# Patient Record
Sex: FEMALE | Race: WHITE | URBAN - METROPOLITAN AREA
[De-identification: names, ages, dates, MRNs, and addresses within clinical notes are randomized per-mention and may not be internally consistent; named-entity substitution may affect disease eponyms.]

---

## 2018-10-18 ENCOUNTER — HOSPITAL ENCOUNTER (INPATIENT)
Age: 63
LOS: 8 days | Discharge: NURSING, REHAB OR LONG-TERM CARE | DRG: 560 | End: 2018-10-26
Attending: PHYSICAL MEDICINE & REHABILITATION | Admitting: PHYSICAL MEDICINE & REHABILITATION
Payer: COMMERCIAL

## 2018-10-18 DIAGNOSIS — S32.441A CLOSED DISPLACED FRACTURE OF POSTERIOR COLUMN OF RIGHT ACETABULUM, INITIAL ENCOUNTER (HCC): Primary | ICD-10-CM

## 2018-10-18 PROCEDURE — 51798 US URINE CAPACITY MEASURE: CPT

## 2018-10-18 PROCEDURE — 94664 DEMO&/EVAL PT USE INHALER: CPT

## 2018-10-18 PROCEDURE — 94760 N-INVAS EAR/PLS OXIMETRY 1: CPT

## 2018-10-18 PROCEDURE — 1280000000 HC REHAB R&B

## 2018-10-18 PROCEDURE — 6370000000 HC RX 637 (ALT 250 FOR IP): Performed by: PHYSICAL MEDICINE & REHABILITATION

## 2018-10-18 RX ORDER — OXYCODONE HYDROCHLORIDE 5 MG/1
5 TABLET ORAL EVERY 4 HOURS PRN
Status: DISCONTINUED | OUTPATIENT
Start: 2018-10-18 | End: 2018-10-26 | Stop reason: HOSPADM

## 2018-10-18 RX ORDER — LEVOTHYROXINE SODIUM 0.1 MG/1
200 TABLET ORAL
Status: DISCONTINUED | OUTPATIENT
Start: 2018-10-19 | End: 2018-10-26 | Stop reason: HOSPADM

## 2018-10-18 RX ORDER — POLYETHYLENE GLYCOL 3350 17 G/17G
17 POWDER, FOR SOLUTION ORAL DAILY
Status: DISCONTINUED | OUTPATIENT
Start: 2018-10-18 | End: 2018-10-23

## 2018-10-18 RX ORDER — BISACODYL 10 MG
10 SUPPOSITORY, RECTAL RECTAL DAILY PRN
Status: DISCONTINUED | OUTPATIENT
Start: 2018-10-18 | End: 2018-10-26 | Stop reason: HOSPADM

## 2018-10-18 RX ORDER — ACETAMINOPHEN 500 MG
1000 TABLET ORAL EVERY 8 HOURS SCHEDULED
Status: DISCONTINUED | OUTPATIENT
Start: 2018-10-18 | End: 2018-10-26 | Stop reason: HOSPADM

## 2018-10-18 RX ORDER — ONDANSETRON 4 MG/1
4 TABLET, FILM COATED ORAL EVERY 8 HOURS PRN
Status: DISCONTINUED | OUTPATIENT
Start: 2018-10-18 | End: 2018-10-26 | Stop reason: HOSPADM

## 2018-10-18 RX ORDER — DOCUSATE SODIUM 100 MG/1
100 CAPSULE, LIQUID FILLED ORAL 2 TIMES DAILY
Status: DISCONTINUED | OUTPATIENT
Start: 2018-10-18 | End: 2018-10-23

## 2018-10-18 RX ORDER — OXYCODONE HYDROCHLORIDE 10 MG/1
10 TABLET ORAL EVERY 4 HOURS PRN
Status: DISCONTINUED | OUTPATIENT
Start: 2018-10-18 | End: 2018-10-26 | Stop reason: HOSPADM

## 2018-10-18 RX ORDER — ATORVASTATIN CALCIUM 20 MG/1
20 TABLET, FILM COATED ORAL DAILY
Status: DISCONTINUED | OUTPATIENT
Start: 2018-10-19 | End: 2018-10-26 | Stop reason: HOSPADM

## 2018-10-18 RX ORDER — OXYCODONE HYDROCHLORIDE 5 MG/1
5 TABLET ORAL EVERY 4 HOURS PRN
Status: DISCONTINUED | OUTPATIENT
Start: 2018-10-18 | End: 2018-10-18 | Stop reason: ALTCHOICE

## 2018-10-18 RX ORDER — OXYCODONE HYDROCHLORIDE 10 MG/1
10 TABLET ORAL EVERY 4 HOURS PRN
Status: DISCONTINUED | OUTPATIENT
Start: 2018-10-18 | End: 2018-10-18 | Stop reason: ALTCHOICE

## 2018-10-18 RX ADMIN — DOCUSATE SODIUM 100 MG: 100 CAPSULE, LIQUID FILLED ORAL at 22:16

## 2018-10-18 RX ADMIN — OXYCODONE HYDROCHLORIDE 10 MG: 10 TABLET ORAL at 20:15

## 2018-10-18 ASSESSMENT — PAIN DESCRIPTION - ONSET: ONSET: GRADUAL

## 2018-10-18 ASSESSMENT — PAIN DESCRIPTION - FREQUENCY: FREQUENCY: CONTINUOUS

## 2018-10-18 ASSESSMENT — PAIN DESCRIPTION - DESCRIPTORS: DESCRIPTORS: ACHING;DISCOMFORT;TENDER

## 2018-10-18 ASSESSMENT — PAIN DESCRIPTION - PAIN TYPE: TYPE: SURGICAL PAIN

## 2018-10-18 ASSESSMENT — PAIN DESCRIPTION - ORIENTATION: ORIENTATION: RIGHT;LOWER

## 2018-10-18 ASSESSMENT — PAIN DESCRIPTION - LOCATION: LOCATION: HIP;BACK;PELVIS

## 2018-10-18 ASSESSMENT — PAIN SCALES - GENERAL: PAINLEVEL_OUTOF10: 7

## 2018-10-18 ASSESSMENT — PAIN DESCRIPTION - PROGRESSION: CLINICAL_PROGRESSION: NOT CHANGED

## 2018-10-18 NOTE — H&P
acetaminophen (TYLENOL) tablet 1,000 mg  1,000 mg Oral 3 times per day Zaira Bravo MD   1,000 mg at 10/19/18 0731    magnesium hydroxide (MILK OF MAGNESIA) 400 MG/5ML suspension 30 mL  30 mL Oral Daily PRN Zaira Bravo MD        docusate sodium (COLACE) capsule 100 mg  100 mg Oral BID Zaira Bravo MD   100 mg at 10/19/18 0731    bisacodyl (DULCOLAX) suppository 10 mg  10 mg Rectal Daily PRN Zaira Bravo MD        polyethylene glycol [facility]) packet 17 g  17 g Oral Daily Zaira Bravo MD   17 g at 10/19/18 0805    enoxaparin (LOVENOX) injection 30 mg  30 mg Subcutaneous BID Zaira Bravo MD   Stopped at 10/19/18 0731    oxyCODONE (ROXICODONE) immediate release tablet 5 mg  5 mg Oral Q4H PRN Zaira Bravo MD        Or    oxyCODONE HCl (OXY-IR) immediate release tablet 10 mg  10 mg Oral Q4H PRN Zaira Bravo MD   10 mg at 10/19/18 8797         REVIEW OF SYSTEMS:   CONSTITUTIONAL: negative for fevers, chills, diaphoresis, appetite change, night sweats, unexpected weight change, +fatigue. EYES: negative for blurred vision, eye discharge, visual disturbance and icterus. HEENT: negative for hearing loss, tinnitus, ear drainage, sinus pressure, nasal congestion, epistaxis and snoring. RESPIRATORY: Negative for hemoptysis, cough, sputum production. +CUELLO  CARDIOVASCULAR: negative for chest pain, palpitations, exertional chest pressure/discomfort, syncope, edema   GASTROINTESTINAL: negative for nausea, vomiting, diarrhea, blood in stool, abdominal pain, +constipation. GENITOURINARY: negative for frequency, dysuria, urinary incontinence, decreased urine volume, and hematuria. HEMATOLOGIC/LYMPHATIC: negative for easy bruising, bleeding and lymphadenopathy. ALLERGIC/IMMUNOLOGIC: negative for recurrent infections, angioedema, anaphylaxis and drug reactions. ENDOCRINE: negative for weight changes and diabetic symptoms including polyuria, polydipsia and polyphagia. MUSCULOSKELETAL: refer to HPI  NEUROLOGICAL: negative for headaches, slurred speech, unilateral weakness. PSYCHIATRIC/BEHAVIORAL: negative for hallucinations, behavioral problems, confusion and agitation. All pertinent positives are noted in the HPI. Physical Examination:  Vitals:   Patient Vitals for the past 24 hrs:   BP Temp Temp src Pulse Resp SpO2 Height Weight   10/18/18 1846 118/65 98.4 °F (36.9 °C) Oral 87 16 96 % 5' 4\" (1.626 m) 187 lb 6.3 oz (85 kg)       Const: Alert. WDWN. No distress  Eyes: Conjunctiva noninjected, no icterus noted; pupils equal, round, and reactive to light. HENT: Atraumatic, normocephalic; Oral mucosa moist  Neck: Trachea midline, neck supple. No thyromegaly noted. CV: Regular rate and rhythm, no murmur rub or gallop noted  Resp: Lungs clear to auscultation bilaterally, no rales wheezes or ronchi, no retractions. Respirations unlabored. GI: Soft, nontender, nondistended. Normal bowel sounds. No palpable masses. Skin: Normal temperature and turgor. No rashes or breakdown noted. Ext: No significant edema appreciated. No varicosities. MSK: Right hip incision c/d/i. Right hip ROM limited by pain. Otherwise no joint tenderness, erythema, warmth noted. AROM intact. Neuro:   -Mental status: Alert. Oriented to person, place, time, situation.   -Language: Speech fluent  -Cranial nerves: Intact  -Sensation intact to light touch. -Motor examination reveals normal strength in all four limbs diffusely except right hip (pain limited). -No abnormalities with finger/nose noted. -Reflexes 2+ and symmetric.  Negative Royce  Psych: Stable mood, normal judgement, normal affect     Lab Results   Component Value Date    WBC 9.4 10/19/2018    HGB 9.3 (L) 10/19/2018    HCT 28.0 (L) 10/19/2018    MCV 88.0 10/19/2018     10/19/2018     No results found for: INR, PROTIME  Lab Results   Component Value Date    CREATININE <0.5 (L) 10/19/2018    BUN 11 10/19/2018

## 2018-10-19 LAB
ANION GAP SERPL CALCULATED.3IONS-SCNC: 12 MMOL/L (ref 3–16)
BASOPHILS ABSOLUTE: 0 K/UL (ref 0–0.2)
BASOPHILS RELATIVE PERCENT: 0.3 %
BUN BLDV-MCNC: 11 MG/DL (ref 7–20)
CALCIUM SERPL-MCNC: 8.3 MG/DL (ref 8.3–10.6)
CHLORIDE BLD-SCNC: 100 MMOL/L (ref 99–110)
CO2: 26 MMOL/L (ref 21–32)
CREAT SERPL-MCNC: <0.5 MG/DL (ref 0.6–1.2)
EOSINOPHILS ABSOLUTE: 0.5 K/UL (ref 0–0.6)
EOSINOPHILS RELATIVE PERCENT: 5.2 %
GFR AFRICAN AMERICAN: >60
GFR NON-AFRICAN AMERICAN: >60
GLUCOSE BLD-MCNC: 92 MG/DL (ref 70–99)
HCT VFR BLD CALC: 28 % (ref 36–48)
HEMOGLOBIN: 9.3 G/DL (ref 12–16)
LYMPHOCYTES ABSOLUTE: 1.9 K/UL (ref 1–5.1)
LYMPHOCYTES RELATIVE PERCENT: 20.2 %
MCH RBC QN AUTO: 29.4 PG (ref 26–34)
MCHC RBC AUTO-ENTMCNC: 33.4 G/DL (ref 31–36)
MCV RBC AUTO: 88 FL (ref 80–100)
MONOCYTES ABSOLUTE: 0.9 K/UL (ref 0–1.3)
MONOCYTES RELATIVE PERCENT: 9.4 %
NEUTROPHILS ABSOLUTE: 6.1 K/UL (ref 1.7–7.7)
NEUTROPHILS RELATIVE PERCENT: 64.9 %
PDW BLD-RTO: 13.1 % (ref 12.4–15.4)
PLATELET # BLD: 259 K/UL (ref 135–450)
PMV BLD AUTO: 9.5 FL (ref 5–10.5)
POTASSIUM REFLEX MAGNESIUM: 3.8 MMOL/L (ref 3.5–5.1)
PREALBUMIN: 8.2 MG/DL (ref 20–40)
RBC # BLD: 3.18 M/UL (ref 4–5.2)
SODIUM BLD-SCNC: 138 MMOL/L (ref 136–145)
WBC # BLD: 9.4 K/UL (ref 4–11)

## 2018-10-19 PROCEDURE — 85025 COMPLETE CBC W/AUTO DIFF WBC: CPT

## 2018-10-19 PROCEDURE — 97166 OT EVAL MOD COMPLEX 45 MIN: CPT

## 2018-10-19 PROCEDURE — 97530 THERAPEUTIC ACTIVITIES: CPT

## 2018-10-19 PROCEDURE — 97535 SELF CARE MNGMENT TRAINING: CPT

## 2018-10-19 PROCEDURE — 80048 BASIC METABOLIC PNL TOTAL CA: CPT

## 2018-10-19 PROCEDURE — 1280000000 HC REHAB R&B

## 2018-10-19 PROCEDURE — 6370000000 HC RX 637 (ALT 250 FOR IP): Performed by: PHYSICAL MEDICINE & REHABILITATION

## 2018-10-19 PROCEDURE — 84134 ASSAY OF PREALBUMIN: CPT

## 2018-10-19 PROCEDURE — 97162 PT EVAL MOD COMPLEX 30 MIN: CPT

## 2018-10-19 RX ORDER — ACYCLOVIR 200 MG/1
400 CAPSULE ORAL 3 TIMES DAILY
Status: DISCONTINUED | OUTPATIENT
Start: 2018-10-19 | End: 2018-10-26 | Stop reason: HOSPADM

## 2018-10-19 RX ORDER — LEVOTHYROXINE SODIUM 0.2 MG/1
200 TABLET ORAL DAILY
COMMUNITY
Start: 2018-09-11

## 2018-10-19 RX ORDER — ERGOCALCIFEROL 1.25 MG/1
50000 CAPSULE ORAL WEEKLY
Status: DISCONTINUED | OUTPATIENT
Start: 2018-10-20 | End: 2018-10-26 | Stop reason: HOSPADM

## 2018-10-19 RX ORDER — LANOLIN ALCOHOL/MO/W.PET/CERES
3 CREAM (GRAM) TOPICAL NIGHTLY PRN
Status: DISCONTINUED | OUTPATIENT
Start: 2018-10-19 | End: 2018-10-26 | Stop reason: HOSPADM

## 2018-10-19 RX ORDER — ATORVASTATIN CALCIUM 40 MG/1
20 TABLET, FILM COATED ORAL DAILY
COMMUNITY
Start: 2018-09-11

## 2018-10-19 RX ORDER — ERGOCALCIFEROL (VITAMIN D2) 1250 MCG
50000 CAPSULE ORAL WEEKLY
COMMUNITY

## 2018-10-19 RX ORDER — VALACYCLOVIR HYDROCHLORIDE 500 MG/1
500 TABLET, FILM COATED ORAL 2 TIMES DAILY
COMMUNITY
Start: 2018-09-11

## 2018-10-19 RX ORDER — PANTOPRAZOLE SODIUM 40 MG/1
40 TABLET, DELAYED RELEASE ORAL
Status: DISCONTINUED | OUTPATIENT
Start: 2018-10-19 | End: 2018-10-26 | Stop reason: HOSPADM

## 2018-10-19 RX ADMIN — LEVOTHYROXINE SODIUM 200 MCG: 100 TABLET ORAL at 07:04

## 2018-10-19 RX ADMIN — ACETAMINOPHEN 1000 MG: 500 TABLET ORAL at 15:25

## 2018-10-19 RX ADMIN — PANTOPRAZOLE SODIUM 40 MG: 40 TABLET, DELAYED RELEASE ORAL at 11:15

## 2018-10-19 RX ADMIN — ACYCLOVIR 400 MG: 200 CAPSULE ORAL at 15:25

## 2018-10-19 RX ADMIN — ACETAMINOPHEN 1000 MG: 500 TABLET ORAL at 07:31

## 2018-10-19 RX ADMIN — ACETAMINOPHEN 1000 MG: 500 TABLET ORAL at 22:17

## 2018-10-19 RX ADMIN — OXYCODONE HYDROCHLORIDE 10 MG: 10 TABLET ORAL at 22:15

## 2018-10-19 RX ADMIN — POLYETHYLENE GLYCOL 3350 17 G: 17 POWDER, FOR SOLUTION ORAL at 08:05

## 2018-10-19 RX ADMIN — ACYCLOVIR 400 MG: 200 CAPSULE ORAL at 22:15

## 2018-10-19 RX ADMIN — OXYCODONE HYDROCHLORIDE 10 MG: 10 TABLET ORAL at 07:04

## 2018-10-19 RX ADMIN — DOCUSATE SODIUM 100 MG: 100 CAPSULE, LIQUID FILLED ORAL at 07:31

## 2018-10-19 RX ADMIN — DOCUSATE SODIUM 100 MG: 100 CAPSULE, LIQUID FILLED ORAL at 22:15

## 2018-10-19 RX ADMIN — OXYCODONE HYDROCHLORIDE 10 MG: 10 TABLET ORAL at 15:25

## 2018-10-19 RX ADMIN — OXYCODONE HYDROCHLORIDE 10 MG: 10 TABLET ORAL at 11:07

## 2018-10-19 RX ADMIN — ATORVASTATIN CALCIUM 20 MG: 20 TABLET, FILM COATED ORAL at 08:05

## 2018-10-19 RX ADMIN — OXYCODONE HYDROCHLORIDE 10 MG: 10 TABLET ORAL at 00:20

## 2018-10-19 ASSESSMENT — PAIN DESCRIPTION - FREQUENCY
FREQUENCY: CONTINUOUS
FREQUENCY: CONTINUOUS

## 2018-10-19 ASSESSMENT — PAIN DESCRIPTION - DESCRIPTORS
DESCRIPTORS: ACHING;DISCOMFORT
DESCRIPTORS: DULL;DISCOMFORT
DESCRIPTORS: ACHING;DISCOMFORT;TENDER

## 2018-10-19 ASSESSMENT — PAIN SCALES - GENERAL
PAINLEVEL_OUTOF10: 3
PAINLEVEL_OUTOF10: 7
PAINLEVEL_OUTOF10: 3
PAINLEVEL_OUTOF10: 0
PAINLEVEL_OUTOF10: 8
PAINLEVEL_OUTOF10: 3
PAINLEVEL_OUTOF10: 3
PAINLEVEL_OUTOF10: 8
PAINLEVEL_OUTOF10: 3
PAINLEVEL_OUTOF10: 9
PAINLEVEL_OUTOF10: 7

## 2018-10-19 ASSESSMENT — PAIN DESCRIPTION - ORIENTATION
ORIENTATION: RIGHT;LOWER
ORIENTATION: RIGHT;LOWER
ORIENTATION: RIGHT

## 2018-10-19 ASSESSMENT — PAIN DESCRIPTION - LOCATION
LOCATION: HIP;PELVIS;BACK
LOCATION: HIP
LOCATION: HIP;PELVIS;BACK
LOCATION: HIP;PELVIS;BACK

## 2018-10-19 ASSESSMENT — PAIN DESCRIPTION - PROGRESSION
CLINICAL_PROGRESSION: NOT CHANGED
CLINICAL_PROGRESSION: NOT CHANGED

## 2018-10-19 ASSESSMENT — PAIN DESCRIPTION - ONSET: ONSET: GRADUAL

## 2018-10-19 ASSESSMENT — PAIN DESCRIPTION - PAIN TYPE
TYPE: SURGICAL PAIN

## 2018-10-19 NOTE — PLAN OF CARE
[address] [address]   SUZANNE Zap[address] 69  [phone]    Octavio Han    : 1955  Acct #: [de-identified]  MRN: [id number]   PHYSICIAN:  Janel Steinberg MD    Rehabilitation Diagnosis:    Right posterior column acetabular fracture - s/p ORIF (10/15 with Dr. Ventura Dawson). Wound care. Pain control. DAVY. PT/OT.      Osteoporosis - Vitamin D supplementation     Acute blood loss anemia - Post-surgical. No signs of active bleeding. Monitor Hgb and for sx with therapies. Transfuse prn <7.      SOB with exertion - IS, increase endurance wit therapies. CXR at [facility] with concern for suprahilar mass. Consider repeat vs CT chest to further characterize.      HLD - statin     Hypothyroidism - Levothyroxine     H/o morbid obesity s/p gastrectomy sleeve - Dietician consult.      Bladder - high risk retention - Monitor PVRs, SC prn >300cc     Bowel - Constipation - colace BID, miralax daily, PRN MoM. follow bowel movements. Enema or suppository if needed.      Pain control - Scheduled acetaminophen, prn oxycodone       ADMIT DATE:10/18/2018    Patient Goals: Goal is to return home    Admitting Impairments: Decreased right hip ROM, limited weight bearing, decreased balance, decreased endurance  Barriers: Decreased right hip ROM, limited weight bearing, decreased balance, decreased endurance, medical comorbidities  Participation: Patient works full time as a nurse manager at [employer] and is very active-likes to swim, bike, hike, scrap book, play with grandchildren.   Wants to return to her active lifestyle      CARE PLAN     NURSING:  Octavio Han while on this unit will:     [x] Be continent of bowel and bladder     [x] Have an adequate number of bowel movements  [] Urinate with no urinary retention >300ml in bladder  [] Complete bladder protocol with perry removal  [x] Maintain O2 SATs at 92%  [x] Have pain managed while on ARU       [] Be w   FIM: 2/5      Stairs 2/5   Comprehension 7/7   Expression 7/7   Social Interaction 7/7   Problem Solving 7/7   Memory 7/7        Wash Mixer Ord will be seen a minimum of 3 hours of therapy per day, a minimum of 5 out of 7 days per week. [] In this rare instance due to the nature of this patient's medical involvement, this patient will be seen 15 hours per week (900 minutes within a 7 day period). Treatments may include therapeutic exercises, gait training, neuromuscular re-ed, transfer training, community reintegration, bed mobility, w/c mobility and training, self care, home mgmt,  energy conservation,dysphagia tx, speech/language/communication therapy,  and patient/family education. In addition, dietician/nutritionist may monitor calorie count as well as intake and collaboratively work with SLP on dietary upgrades. Neuropsychology/Psychology may evaluate and provide necessary support. Medical issues being managed closely and that require 24 hour availability of a physician:   [] Swallowing Precautions  [x] Bowel/Bladder Fx  [x] Weight bearing precautions   [x] Wound Care    [x] Pain Mgmt   [x] Infection Protection   [x] DVT Prophylaxis   [x] Fall Precautions  [] Fluid/Electrolyte/Nutrition Balance   [] Voice Protection   [] Respiratory  [] Other:    Medical Prognosis: [x] Good  [] Fair    [] Guarded   Total expected IRF days 7  Anticipated discharge destination:    [] Home Independently   [x] Home Modified Independent  [] Home with supervision    []SNF     [] Other                                           Physician anticipated functional outcomes:  Increase gait, transfers, self care to modified independent to allow safe return home with      IPOC brief synthesis: Ms. Sharonda Harden is a 62 yo F with pmh osteoporosis, hypothyroidism, HLD, and obesity s/p gastrectomy sleeve who initially presented to [facility] on 10/12/18 with right hip pain after ground level fall.  Imaging revealed T type

## 2018-10-19 NOTE — PROGRESS NOTES
Physical Therapy    Facility/Department: [address] IP REHAB  Initial Assessment    NAME: Leonides Lazaro  : 1955  MRN: [id number]    Date of Service: 10/19/2018    Discharge Recommendations:  Continue to assess pending progress, Patient would benefit from continued therapy after discharge, Home with Home health PT   PT Equipment Recommendations  Equipment Needed: No  Other: Will continue to assess DME needs     Patient Diagnosis(es): There were no encounter diagnoses. has a past medical history of Arthritis; Hyperlipidemia; Hypertension; and Thyroid disease. has a past surgical history that includes Abdomen surgery; Colonoscopy; fracture surgery; Dilatation, esophagus; Hysterectomy; and hernia repair. Restrictions  Restrictions/Precautions  Restrictions/Precautions: Weight Bearing  Required Braces or Orthoses?: No  Lower Extremity Weight Bearing Restrictions  Right Lower Extremity Weight Bearing: Flat Foot Weight Bearing  Partial Weight Bearing Percentage Or Pounds: foot all the way flat but no pressure (DAVY)  Vision/Hearing  Vision: Impaired  Vision Exceptions: Wears glasses for distance (wears bifocals just for distance )  Hearing: Within functional limits     Subjective  General  Chart Reviewed: Yes  Additional Pertinent Hx: Per Dr. Bridger Ross note, Ms. Leonides Lazaro is a 62 yo F with pmh osteoporosis, hypothyroidism, HLD, and obesity s/p gastrectomy sleeve who initially presented to [facility] on 10/12/18 with right hip pain after ground level fall. Imaging revealed T type transverse tectal bicolumnar right acetabular fracture with posterior dislocation and impaction fracture of the right femoral head with small intra-articular fragments. She underwent ORIF with Dr. Fofana Standing on 10/15. Now weight of leg flatfoot weightbearing. Post-op course complicated by blood loss anemia and constipation.    Response To Previous Treatment: Not applicable  Family / Caregiver Present: No  Referring Practitioner:  decrease R LE strength, and decreased R LE endurance  Clinical Presentation: Evolving   Patient Education: Role of PT, POC, safe transfer technique   Barriers to Learning: None   REQUIRES PT FOLLOW UP: Yes  Activity Tolerance  Activity Tolerance: Patient Tolerated treatment well;Patient limited by fatigue;Patient limited by pain  Activity Tolerance: Pt able to tolerate PT eval this date          Plan   Plan  Times per week: 5-6x/week   Times per day: Twice a day  Plan weeks: 1 week   Current Treatment Recommendations: Strengthening, ROM, Balance Training, Functional Mobility Training, Transfer Training, Endurance Training, Gait Training, Stair training, Home Exercise Program, Safety Education & Training, Patient/Caregiver Education & Training, Equipment Evaluation, Education, & procurement  Safety Devices  Type of devices: All fall risk precautions in place, Gait belt, Patient at risk for falls, Left in chair (Pt transport took pt back to room at end of evaluation )  Restraints  Initially in place: No      Goals  Short term goals  Time Frame for Short term goals: Approximately 1 week from 10/19/18  Short term goal 1: Bed mobility with Mod I  Short term goal 2: Transfers with Mod I   Short term goal 3: Ambulate 150' with LRAD and Mod I   Short term goal 4: Ascend/descend curb step with LRAD and supervision   Short term goal 5: Ascend/descend 12 steps with unilateral and supervision (Anticiapte pt will need to ascend steps on buttocks)  Long term goals  Time Frame for Long term goals : STG=LTG  Patient Goals   Patient goals : To get back to being independent as possible and doing everything I was before.         Therapy Time       Individual Co-treatment   Time In 0915    Time Out 1000    Minutes 39      Second Session   Individual Concurrent Group Co-treatment   Time In 5616         Time Out 1600         Minutes 45         Timed Code Treatment Minutes: 45 Minutes     Electronically signed by Albino Schneider on 10/19/2018

## 2018-10-19 NOTE — CONSULTS
Nutrition Assessment    Type and Reason for Visit: Initial, Consult    Malnutrition Assessment:  · Malnutrition Status: No malnutrition    Nutrition Diagnosis:   · Problem: No nutrition diagnosis at this time    Nutrition Assessment:  · Subjective Assessment: Consult for new pt to ARU. Pt admitted with right hip pain. Pt with PMH of obesity s/p gastric sleeve, HLD, HTN, thryoid disease. Pt s/p gastric sleeve x 1 year. Pt has lost ~50# since her surgery. Pt states she has a good appetite but does eat smaller portions than normal people. Pre requested Ensure HP once a day since she usually drinks one at home to occasionally replace a meal.     Nutrition Risk Level   Risk Level: Low    Nutrition Intervention  Food and/or Delivery: Continue current diet, Start ONS  Nutrition Education/Counseling/Coordination of Care:  Continued Inpatient Monitoring, Education Not Indicated    Patient assessed for nutrition risk. Deemed to be at low risk at this time. Will continue to follow patient.       Electronically signed by Darren Saini RD, ANABELLE on 10/19/18 at 12:42 PM    Contact Number: [phone]

## 2018-10-19 NOTE — PROGRESS NOTES
4 Eyes Skin Assessment     The patient is being assess for  Admission    I agree that 2 RN's have performed a thorough Head to Toe Skin Assessment on the patient. ALL assessment sites listed below have been assessed. Areas assessed by both nurses: Rickey French  [x]   Head, Face, and Ears   [x]   Shoulders, Back, and Chest  [x]   Arms, Elbows, and Hands   [x]   Coccyx, Sacrum, and IschIum  [x]   Legs, Feet, and Heels        Does the Patient have Skin Breakdown?   Yes LDA WOUND CARE was Initiated documentation include the Arleth-wound, Wound Assessment, Measurements, Dressing Treatment, Drainage, and Color\",         Tyler Prevention initiated:  No   Wound Care Orders initiated:  Yes      [id number]  nurse consulted for Pressure Injury (Stage 3,4, Unstageable, DTI, NWPT, and Complex wounds), New and Established Ostomies:  No      Nurse 1 eSignature: Electronically signed by Melany Andrade RN on 10/19/18 at 3:11 AM    **SHARE this note so that the co-signing nurse is able to place an eSignature**    Nurse 2 eSignature: Electronically signed by Sarina Womack RN on 10/19/18 at 3:34 AM

## 2018-10-20 PROCEDURE — 90686 IIV4 VACC NO PRSV 0.5 ML IM: CPT | Performed by: PHYSICAL MEDICINE & REHABILITATION

## 2018-10-20 PROCEDURE — 6370000000 HC RX 637 (ALT 250 FOR IP): Performed by: PHYSICAL MEDICINE & REHABILITATION

## 2018-10-20 PROCEDURE — 1280000000 HC REHAB R&B

## 2018-10-20 PROCEDURE — G0008 ADMIN INFLUENZA VIRUS VAC: HCPCS | Performed by: PHYSICAL MEDICINE & REHABILITATION

## 2018-10-20 PROCEDURE — 97116 GAIT TRAINING THERAPY: CPT

## 2018-10-20 PROCEDURE — 97110 THERAPEUTIC EXERCISES: CPT

## 2018-10-20 PROCEDURE — 6360000002 HC RX W HCPCS: Performed by: PHYSICAL MEDICINE & REHABILITATION

## 2018-10-20 PROCEDURE — 97530 THERAPEUTIC ACTIVITIES: CPT

## 2018-10-20 PROCEDURE — 94760 N-INVAS EAR/PLS OXIMETRY 1: CPT

## 2018-10-20 PROCEDURE — 97535 SELF CARE MNGMENT TRAINING: CPT

## 2018-10-20 RX ADMIN — ACYCLOVIR 400 MG: 200 CAPSULE ORAL at 14:59

## 2018-10-20 RX ADMIN — DOCUSATE SODIUM 100 MG: 100 CAPSULE, LIQUID FILLED ORAL at 21:21

## 2018-10-20 RX ADMIN — OXYCODONE HYDROCHLORIDE 10 MG: 10 TABLET ORAL at 04:21

## 2018-10-20 RX ADMIN — ACYCLOVIR 400 MG: 200 CAPSULE ORAL at 21:21

## 2018-10-20 RX ADMIN — POLYETHYLENE GLYCOL 3350 17 G: 17 POWDER, FOR SOLUTION ORAL at 08:02

## 2018-10-20 RX ADMIN — ACYCLOVIR 400 MG: 200 CAPSULE ORAL at 08:04

## 2018-10-20 RX ADMIN — OXYCODONE HYDROCHLORIDE 10 MG: 10 TABLET ORAL at 11:07

## 2018-10-20 RX ADMIN — OXYCODONE HYDROCHLORIDE 10 MG: 10 TABLET ORAL at 21:21

## 2018-10-20 RX ADMIN — LEVOTHYROXINE SODIUM 200 MCG: 100 TABLET ORAL at 06:20

## 2018-10-20 RX ADMIN — ATORVASTATIN CALCIUM 20 MG: 20 TABLET, FILM COATED ORAL at 08:04

## 2018-10-20 RX ADMIN — ENOXAPARIN SODIUM 30 MG: 30 INJECTION SUBCUTANEOUS at 08:05

## 2018-10-20 RX ADMIN — ACETAMINOPHEN 1000 MG: 500 TABLET ORAL at 14:59

## 2018-10-20 RX ADMIN — ACETAMINOPHEN 1000 MG: 500 TABLET ORAL at 06:20

## 2018-10-20 RX ADMIN — ENOXAPARIN SODIUM 30 MG: 30 INJECTION SUBCUTANEOUS at 21:21

## 2018-10-20 RX ADMIN — ACETAMINOPHEN 1000 MG: 500 TABLET ORAL at 21:21

## 2018-10-20 RX ADMIN — ERGOCALCIFEROL 50000 UNITS: 1.25 CAPSULE ORAL at 08:05

## 2018-10-20 RX ADMIN — INFLUENZA A VIRUS A/MICHIGAN/45/2015 X-275 (H1N1) ANTIGEN (FORMALDEHYDE INACTIVATED), INFLUENZA A VIRUS A/SINGAPORE/INFIMH-16-0019/2016 IVR-186 (H3N2) ANTIGEN (FORMALDEHYDE INACTIVATED), INFLUENZA B VIRUS B/PHUKET/3073/2013 ANTIGEN (FORMALDEHYDE INACTIVATED), AND INFLUENZA B VIRUS B/MARYLAND/15/2016 BX-69A ANTIGEN (FORMALDEHYDE INACTIVATED) 0.5 ML: 15; 15; 15; 15 INJECTION, SUSPENSION INTRAMUSCULAR at 08:02

## 2018-10-20 RX ADMIN — PANTOPRAZOLE SODIUM 40 MG: 40 TABLET, DELAYED RELEASE ORAL at 06:20

## 2018-10-20 RX ADMIN — DOCUSATE SODIUM 100 MG: 100 CAPSULE, LIQUID FILLED ORAL at 08:05

## 2018-10-20 ASSESSMENT — PAIN SCALES - GENERAL
PAINLEVEL_OUTOF10: 7
PAINLEVEL_OUTOF10: 4
PAINLEVEL_OUTOF10: 4
PAINLEVEL_OUTOF10: 0
PAINLEVEL_OUTOF10: 7
PAINLEVEL_OUTOF10: 5
PAINLEVEL_OUTOF10: 7
PAINLEVEL_OUTOF10: 0
PAINLEVEL_OUTOF10: 7
PAINLEVEL_OUTOF10: 7
PAINLEVEL_OUTOF10: 4

## 2018-10-20 ASSESSMENT — PAIN DESCRIPTION - ORIENTATION
ORIENTATION: RIGHT

## 2018-10-20 ASSESSMENT — PAIN DESCRIPTION - LOCATION
LOCATION: KNEE;LEG
LOCATION: LEG;HIP
LOCATION: HIP;PELVIS

## 2018-10-20 ASSESSMENT — PAIN DESCRIPTION - PAIN TYPE
TYPE: SURGICAL PAIN;ACUTE PAIN
TYPE: SURGICAL PAIN
TYPE: SURGICAL PAIN;ACUTE PAIN

## 2018-10-20 ASSESSMENT — PAIN DESCRIPTION - DESCRIPTORS: DESCRIPTORS: DULL;ACHING;THROBBING

## 2018-10-20 NOTE — PROGRESS NOTES
Occupational Therapy  Facility/Department: [address] IP REHAB  Daily Treatment Note  NAME: James Brooke  : 1955  MRN: [id number]    Date of Service: 10/20/2018    Discharge Recommendations:  Home with assist PRN, Home with Home health OT  OT Equipment Recommendations  Equipment Needed: Yes  Walker: Rolling  ADL Assistive Devices: Shower Chair with back; Reacher  Other: Pt may need grab bars in shower, hip kit, walker basket or tray, RTS vs BSC or TSF. ..continue to assess    Patient Diagnosis(es): There were no encounter diagnoses. has a past medical history of Arthritis; Hyperlipidemia; Hypertension; and Thyroid disease. has a past surgical history that includes Abdomen surgery; Colonoscopy; fracture surgery; Dilatation, esophagus; Hysterectomy; and hernia repair. Restrictions  Restrictions/Precautions  Restrictions/Precautions: Weight Bearing  Required Braces or Orthoses?: No  Lower Extremity Weight Bearing Restrictions  Right Lower Extremity Weight Bearing: Flat Foot Weight Bearing  Partial Weight Bearing Percentage Or Pounds: foot all the way flat but no pressure (DAVY)  Subjective   General  Chart Reviewed: Yes  Additional Pertinent Hx: Per Dr Mary Kate Khan 10/18/18 admit note: Ms. James Brooke is a 62 yo F with pmh osteoporosis, hypothyroidism, HLD, and obesity s/p gastrectomy sleeve on 10/23/17, who initially presented to [facility] on 10/12/18 with right hip pain after ground level fall. Imaging revealed T type transverse tectal bicolumnar right acetabular fracture with posterior dislocation and impaction fracture of the right femoral head with small intra-articular fragments. She underwent ORIF with Dr. Reina Damon on 10/15. Now weight of leg flatfoot weightbearing. additional PMH : ADD, hernia repair, hysterectomy, R wrist fx, rib fxs, R foot fx, & htn.    Response to previous treatment: Patient with no complaints from previous session  Family / Caregiver Present: No  Referring Practitioner:  [facility]  Diagnosis: fell on 10/12/18 resulting in T type transverse tectal bicolumnar right acetabular fracture with posterior dislocation and impaction fracture of the right femoral head with small intra-articular fragments . s/p ORIF 10/15/18. Subjective  Subjective: Pt met in room, in recliner. Pt requests pain meds prior to activity. Pt agrees to sponge bath, hopes it \"will give me some energy. \"  Pain Assessment  Patient Currently in Pain: Yes (RN brings pain meds. 7/10 \"deep & dull, a throbbing deep ache\" to R thigh & knee)  Pain Assessment: 0-10  Pain Level: 7  Pain Type: Surgical pain;Acute pain  Pain Location: Knee;Leg (thigh)  Pain Orientation: Right  Pain Descriptors: Dull;Aching; Throbbing (\"deep and dull, a throbbing, deep ache\")  Pain Intervention(s): Repositioned;RN notified; Ambulation/Increased activity; Shower  Response to Pain Intervention: Patient Satisfied    Orientation  Orientation  Overall Orientation Status: Within Functional Limits     Objective    ADL  Feeding: Independent (lunch arrived at end of session)  Grooming: Independent (seated for all tasks, including makeup)  UE Bathing: Modified independent  (seated on shower chair with back, [address] [place] [address] Street)  LE Bathing: Contact guard assistance;Stand by assistance (pt reports dressing to RLE was not covered yesterday, so OT checks with RN today, who says dressing can get wet & RN will change after shower. Pt CGA/SBA in stance for buttocks, steps on towel to bathe/dry R foot. Seated on shower chair w/ back, [address] [place] [address] Street, grab bars in stance, wet towel under feet for nonskid surface)  UE Dressing: Setup  LE Dressing:  (CGA/SBA stance for pants over hips; pt doffs footies but assist to don on R & pt dons on L. Assist threading pants over RLE- pt attempted to use leg .  She may benefit from reacher next session.)  Toileting: Stand by assistance;Contact guard assistance     Instrumental ADL's  Instrumental ADLs: Yes  Light Housekeeping  Light Housekeeping Level: Wheelchair  Light Housekeeping Level of Assistance: Stand by assistance;Minimal assistance  Light Housekeeping: Pt reports she most likely will not do laundry at home. Today, she completed from w/c level, VCs to lock brakes prior to reaching- pt receptive. Pt tossed clothing into washer, OT did pour detergent. Balance  Sitting Balance: Independent  Standing Balance:  (CGA-SBA with RW for UB support)  Standing Balance  Time: ~5 mins  Activity: static stance after shower while awaiting dressing change- SBA    Functional Mobility  Functional - Mobility Device: Rolling Walker  Assist Level:  (CGA-SBA)  Functional Mobility Comments: fxl mobility short distances around room to participate in ADLs. Pt steady, no LOB, improving maintainence to WB status    Toilet Transfers  Toilet - Technique: Ambulating (RW)  Equipment Used: Grab bars (comfort height commode)  Toilet Transfer: Contact guard assistance;Stand by assistance    Shower Transfers  Shower - Transfer From: [facility] - Transfer Type: To and From  Shower - Transfer To: Shower seat with back  Shower - Technique: Ambulating  Shower Transfers: Stand by assistance;Contact Guard  Shower Transfers Comments: wet towel under feet for nonskid surface, grab bar. VCs for technique & hand placement    Wheelchair Bed Transfers  Wheelchair/Bed - Technique: Ambulating (RW)  Equipment Used: Wheelchair; Other (recliner)  Level of Asssistance: Stand by assistance;Contact guard assistance                               Cognition  Overall Cognitive Status: WFL                                            Assessment   Performance deficits / Impairments: Decreased functional mobility ; Decreased strength;Decreased endurance;Decreased ADL status; Decreased safe awareness;Decreased high-level IADLs;Decreased balance  Assessment: Pt tolerated OT tx well and is progressing towards her goals. She is now CGA-SBA for ADL fxl transfers and mobility, min a ADLs.  Pt is motivated to

## 2018-10-21 PROCEDURE — 94760 N-INVAS EAR/PLS OXIMETRY 1: CPT

## 2018-10-21 PROCEDURE — 6370000000 HC RX 637 (ALT 250 FOR IP): Performed by: PHYSICAL MEDICINE & REHABILITATION

## 2018-10-21 PROCEDURE — 1280000000 HC REHAB R&B

## 2018-10-21 PROCEDURE — 6360000002 HC RX W HCPCS: Performed by: PHYSICAL MEDICINE & REHABILITATION

## 2018-10-21 RX ADMIN — ACYCLOVIR 400 MG: 200 CAPSULE ORAL at 22:12

## 2018-10-21 RX ADMIN — ACETAMINOPHEN 1000 MG: 500 TABLET ORAL at 22:12

## 2018-10-21 RX ADMIN — POLYETHYLENE GLYCOL 3350 17 G: 17 POWDER, FOR SOLUTION ORAL at 08:13

## 2018-10-21 RX ADMIN — ACETAMINOPHEN 1000 MG: 500 TABLET ORAL at 06:30

## 2018-10-21 RX ADMIN — ACYCLOVIR 400 MG: 200 CAPSULE ORAL at 13:22

## 2018-10-21 RX ADMIN — DOCUSATE SODIUM 100 MG: 100 CAPSULE, LIQUID FILLED ORAL at 22:12

## 2018-10-21 RX ADMIN — ENOXAPARIN SODIUM 30 MG: 30 INJECTION SUBCUTANEOUS at 08:14

## 2018-10-21 RX ADMIN — LEVOTHYROXINE SODIUM 200 MCG: 100 TABLET ORAL at 06:30

## 2018-10-21 RX ADMIN — OXYCODONE HYDROCHLORIDE 10 MG: 10 TABLET ORAL at 22:12

## 2018-10-21 RX ADMIN — ENOXAPARIN SODIUM 30 MG: 30 INJECTION SUBCUTANEOUS at 22:12

## 2018-10-21 RX ADMIN — ACYCLOVIR 400 MG: 200 CAPSULE ORAL at 08:14

## 2018-10-21 RX ADMIN — OXYCODONE HYDROCHLORIDE 10 MG: 10 TABLET ORAL at 06:30

## 2018-10-21 RX ADMIN — PANTOPRAZOLE SODIUM 40 MG: 40 TABLET, DELAYED RELEASE ORAL at 06:30

## 2018-10-21 RX ADMIN — ATORVASTATIN CALCIUM 20 MG: 20 TABLET, FILM COATED ORAL at 08:15

## 2018-10-21 RX ADMIN — DOCUSATE SODIUM 100 MG: 100 CAPSULE, LIQUID FILLED ORAL at 08:13

## 2018-10-21 RX ADMIN — ACETAMINOPHEN 1000 MG: 500 TABLET ORAL at 13:22

## 2018-10-21 ASSESSMENT — PAIN SCALES - GENERAL
PAINLEVEL_OUTOF10: 7
PAINLEVEL_OUTOF10: 5
PAINLEVEL_OUTOF10: 7
PAINLEVEL_OUTOF10: 3
PAINLEVEL_OUTOF10: 4
PAINLEVEL_OUTOF10: 0
PAINLEVEL_OUTOF10: 4

## 2018-10-21 ASSESSMENT — PAIN DESCRIPTION - PAIN TYPE: TYPE: SURGICAL PAIN

## 2018-10-21 NOTE — PROGRESS NOTES
Rested quietly overnight. Pt admitted with debility s/p fall and right femur fracture. SI to right hip/thigh CD&I. Dressings to right knee covering old traction sites with scant amount of drainage. Dressings changed, pt tolerated well. Transfers x1 with a walker. Lungs CTA, HR regular, abdomen non tender with active bowel sounds. Did have a BM last evening. Call light remains in reach at all times, will monitor.  Rosemarie Kay RN

## 2018-10-22 LAB
ANION GAP SERPL CALCULATED.3IONS-SCNC: 12 MMOL/L (ref 3–16)
BASOPHILS ABSOLUTE: 0 K/UL (ref 0–0.2)
BASOPHILS RELATIVE PERCENT: 0.5 %
BUN BLDV-MCNC: 13 MG/DL (ref 7–20)
CALCIUM SERPL-MCNC: 8.7 MG/DL (ref 8.3–10.6)
CHLORIDE BLD-SCNC: 102 MMOL/L (ref 99–110)
CO2: 26 MMOL/L (ref 21–32)
CREAT SERPL-MCNC: <0.5 MG/DL (ref 0.6–1.2)
EOSINOPHILS ABSOLUTE: 0.5 K/UL (ref 0–0.6)
EOSINOPHILS RELATIVE PERCENT: 5.2 %
GFR AFRICAN AMERICAN: >60
GFR NON-AFRICAN AMERICAN: >60
GLUCOSE BLD-MCNC: 98 MG/DL (ref 70–99)
HCT VFR BLD CALC: 27.4 % (ref 36–48)
HEMOGLOBIN: 9.3 G/DL (ref 12–16)
LYMPHOCYTES ABSOLUTE: 2.1 K/UL (ref 1–5.1)
LYMPHOCYTES RELATIVE PERCENT: 24 %
MCH RBC QN AUTO: 29.5 PG (ref 26–34)
MCHC RBC AUTO-ENTMCNC: 33.8 G/DL (ref 31–36)
MCV RBC AUTO: 87.5 FL (ref 80–100)
MONOCYTES ABSOLUTE: 0.9 K/UL (ref 0–1.3)
MONOCYTES RELATIVE PERCENT: 10.8 %
NEUTROPHILS ABSOLUTE: 5.1 K/UL (ref 1.7–7.7)
NEUTROPHILS RELATIVE PERCENT: 59.5 %
PDW BLD-RTO: 13.7 % (ref 12.4–15.4)
PLATELET # BLD: 380 K/UL (ref 135–450)
PMV BLD AUTO: 8.8 FL (ref 5–10.5)
POTASSIUM REFLEX MAGNESIUM: 4 MMOL/L (ref 3.5–5.1)
RBC # BLD: 3.14 M/UL (ref 4–5.2)
SODIUM BLD-SCNC: 140 MMOL/L (ref 136–145)
WBC # BLD: 8.6 K/UL (ref 4–11)

## 2018-10-22 PROCEDURE — 1280000000 HC REHAB R&B

## 2018-10-22 PROCEDURE — 97110 THERAPEUTIC EXERCISES: CPT

## 2018-10-22 PROCEDURE — 97530 THERAPEUTIC ACTIVITIES: CPT

## 2018-10-22 PROCEDURE — 80048 BASIC METABOLIC PNL TOTAL CA: CPT

## 2018-10-22 PROCEDURE — 94760 N-INVAS EAR/PLS OXIMETRY 1: CPT

## 2018-10-22 PROCEDURE — 6360000002 HC RX W HCPCS: Performed by: PHYSICAL MEDICINE & REHABILITATION

## 2018-10-22 PROCEDURE — 97535 SELF CARE MNGMENT TRAINING: CPT

## 2018-10-22 PROCEDURE — 85025 COMPLETE CBC W/AUTO DIFF WBC: CPT

## 2018-10-22 PROCEDURE — 6370000000 HC RX 637 (ALT 250 FOR IP): Performed by: PHYSICAL MEDICINE & REHABILITATION

## 2018-10-22 PROCEDURE — 36415 COLL VENOUS BLD VENIPUNCTURE: CPT

## 2018-10-22 RX ADMIN — ENOXAPARIN SODIUM 30 MG: 30 INJECTION SUBCUTANEOUS at 21:31

## 2018-10-22 RX ADMIN — ACYCLOVIR 400 MG: 200 CAPSULE ORAL at 07:25

## 2018-10-22 RX ADMIN — ACETAMINOPHEN 1000 MG: 500 TABLET ORAL at 21:39

## 2018-10-22 RX ADMIN — LEVOTHYROXINE SODIUM 200 MCG: 100 TABLET ORAL at 06:09

## 2018-10-22 RX ADMIN — POLYETHYLENE GLYCOL 3350 17 G: 17 POWDER, FOR SOLUTION ORAL at 07:26

## 2018-10-22 RX ADMIN — DOCUSATE SODIUM 100 MG: 100 CAPSULE, LIQUID FILLED ORAL at 07:25

## 2018-10-22 RX ADMIN — ENOXAPARIN SODIUM 30 MG: 30 INJECTION SUBCUTANEOUS at 07:26

## 2018-10-22 RX ADMIN — PANTOPRAZOLE SODIUM 40 MG: 40 TABLET, DELAYED RELEASE ORAL at 06:09

## 2018-10-22 RX ADMIN — ACETAMINOPHEN 1000 MG: 500 TABLET ORAL at 14:03

## 2018-10-22 RX ADMIN — ACYCLOVIR 400 MG: 200 CAPSULE ORAL at 14:02

## 2018-10-22 RX ADMIN — OXYCODONE HYDROCHLORIDE 10 MG: 10 TABLET ORAL at 21:31

## 2018-10-22 RX ADMIN — ACETAMINOPHEN 1000 MG: 500 TABLET ORAL at 06:10

## 2018-10-22 RX ADMIN — OXYCODONE HYDROCHLORIDE 10 MG: 10 TABLET ORAL at 07:26

## 2018-10-22 RX ADMIN — OXYCODONE HYDROCHLORIDE 10 MG: 10 TABLET ORAL at 14:03

## 2018-10-22 RX ADMIN — ACYCLOVIR 400 MG: 200 CAPSULE ORAL at 21:31

## 2018-10-22 RX ADMIN — ATORVASTATIN CALCIUM 20 MG: 20 TABLET, FILM COATED ORAL at 07:25

## 2018-10-22 RX ADMIN — DOCUSATE SODIUM 100 MG: 100 CAPSULE, LIQUID FILLED ORAL at 21:31

## 2018-10-22 ASSESSMENT — PAIN SCALES - GENERAL
PAINLEVEL_OUTOF10: 0
PAINLEVEL_OUTOF10: 7
PAINLEVEL_OUTOF10: 6
PAINLEVEL_OUTOF10: 6
PAINLEVEL_OUTOF10: 4
PAINLEVEL_OUTOF10: 6
PAINLEVEL_OUTOF10: 6
PAINLEVEL_OUTOF10: 1

## 2018-10-22 ASSESSMENT — PAIN DESCRIPTION - PAIN TYPE
TYPE: SURGICAL PAIN

## 2018-10-22 ASSESSMENT — PAIN DESCRIPTION - LOCATION
LOCATION: HIP;KNEE
LOCATION: HIP;KNEE
LOCATION: KNEE;HIP

## 2018-10-22 ASSESSMENT — PAIN DESCRIPTION - FREQUENCY
FREQUENCY: CONTINUOUS
FREQUENCY: CONTINUOUS

## 2018-10-22 ASSESSMENT — PAIN DESCRIPTION - DESCRIPTORS
DESCRIPTORS: ACHING;DULL;THROBBING
DESCRIPTORS: ACHING

## 2018-10-22 ASSESSMENT — PAIN DESCRIPTION - ORIENTATION
ORIENTATION: RIGHT

## 2018-10-22 ASSESSMENT — PAIN DESCRIPTION - PROGRESSION: CLINICAL_PROGRESSION: NOT CHANGED

## 2018-10-22 NOTE — PROGRESS NOTES
PT alert and oriented, pleasant and cooperative this morning. Pt requested pain medication prior to start of therapy this am, pain meds given per order (see MAR). Pt states she slept well throughout the night, Currently pt resting in room. Will continue to monitor.

## 2018-10-22 NOTE — PLAN OF CARE
Problem: Pain:  Goal: Pain level will decrease  Pain level will decrease   Outcome: Ongoing  Assess pts level of pain. Medicate as ordered for pain. Evaluate effectiveness of pain med given. Notify physician if pain not controlled. Problem: Infection - Surgical Site:  Goal: Will show no infection signs and symptoms  Will show no infection signs and symptoms   Outcome: Ongoing  Pt is free of signs and symptoms of infection. Incision and dressing are clean, dry and intact. Vital signs stable. Will monitor. Problem: Skin Integrity:  Goal: Absence of new skin breakdown  Absence of new skin breakdown   Outcome: Ongoing  Pt is at risk for impaired skin integrity. Assess skin every shift and prn. Keep heels off bed. Keep skin clean and dry. Goal: Will show no infection signs and symptoms  Will show no infection signs and symptoms   Outcome: Ongoing  Pt is free of signs and symptoms of infection. Incision and dressing are clean, dry and intact. Vital signs stable. Will monitor. Problem: Falls - Risk of:  Goal: Will remain free from falls  Will remain free from falls   Outcome: Ongoing  Pt is at risk for falls. Call light in reach. Bed in low position. Alarm on. Nonskid footwear on. Possessions in reach.

## 2018-10-22 NOTE — PROGRESS NOTES
chest to further characterize.      HLD - statin     Hypothyroidism - Levothyroxine     H/o morbid obesity s/p gastrectomy sleeve - Dietician consult.      Bladder - high risk retention - Monitor PVRs, SC prn >300cc     Bowel - Constipation - colace BID, miralax daily, PRN MoM. follow bowel movements. Enema or suppository if needed.      Pain control - Scheduled acetaminophen, prn oxycodone     Safety - fall precautions     PPx  DVT: Lovenox x 6 weeks post-op  GI: pantoprazole     Rehab Progress: Tolerating therapy well thus far. Working on functional mobility, balance, compensatory strategies. Anticipated Dispo: home with significant other  Services/DME: TBD  ELOS: 10 days      Karan Polanco MD 10/22/2018, 8:25 AM

## 2018-10-22 NOTE — PROGRESS NOTES
use during ADL tomorrow. Pt declines elastic shoe laces, reports her shoes do not have laces, except for work and she will wear different shoes to work if needed. Therex:  Red hand gripper BUE x20 reps x2 sets. Red theraband BUE x10 reps x7 sets. Therex to maximize BUE strength, endurance, and activity tolerance for self-care and fxl transfers/mobility. Functional Transfers (via RW; pt maintaining DAVY WBing): Wheelchair: SBA  Car: SBA via sit and swing technique, OT did manage car door for pt  Terrace chair with armrests: SBA    Functional Mobility:  SBA via RW short distances indoors and outside on terrace. Pt maintaining DAVY WBing. Pt steady, no LOB. OT did open terrace door for pt. Assessment: Pt progressing well, SBA fxl transfers/mobility and doing well maintaining DAVY WBing. Pt motivated, anticipate d/c by EOW.     Safety Device - Type of devices:  []  All fall risk precautions in place [] Bed alarm in place  [] Call light within reach [] Chair alarm in place [] Positioning belt [x] Gait belt [] Patient at risk for falls [] Left in bed [] Left in chair [] Telesitter in use [] Sitter present [] Nurse notified [x]  Left in w/c in care of transporter      Therapy Time   Individual Co-treatment   Time In 0454     Time Out 1600     Minutes 45       Electronically signed by MAGDI Lim #[id number] on 10/22/2018 at 4:09 PM

## 2018-10-23 PROCEDURE — 97110 THERAPEUTIC EXERCISES: CPT

## 2018-10-23 PROCEDURE — 97530 THERAPEUTIC ACTIVITIES: CPT

## 2018-10-23 PROCEDURE — 1280000000 HC REHAB R&B

## 2018-10-23 PROCEDURE — 6360000002 HC RX W HCPCS: Performed by: PHYSICAL MEDICINE & REHABILITATION

## 2018-10-23 PROCEDURE — 94760 N-INVAS EAR/PLS OXIMETRY 1: CPT

## 2018-10-23 PROCEDURE — 97535 SELF CARE MNGMENT TRAINING: CPT

## 2018-10-23 PROCEDURE — 6370000000 HC RX 637 (ALT 250 FOR IP): Performed by: PHYSICAL MEDICINE & REHABILITATION

## 2018-10-23 RX ORDER — POLYETHYLENE GLYCOL 3350 17 G/17G
17 POWDER, FOR SOLUTION ORAL DAILY PRN
Status: DISCONTINUED | OUTPATIENT
Start: 2018-10-23 | End: 2018-10-26 | Stop reason: HOSPADM

## 2018-10-23 RX ORDER — DOCUSATE SODIUM 100 MG/1
100 CAPSULE, LIQUID FILLED ORAL 2 TIMES DAILY PRN
Status: DISCONTINUED | OUTPATIENT
Start: 2018-10-23 | End: 2018-10-26 | Stop reason: HOSPADM

## 2018-10-23 RX ADMIN — ACETAMINOPHEN 1000 MG: 500 TABLET ORAL at 14:55

## 2018-10-23 RX ADMIN — ENOXAPARIN SODIUM 30 MG: 30 INJECTION SUBCUTANEOUS at 09:17

## 2018-10-23 RX ADMIN — ACETAMINOPHEN 1000 MG: 500 TABLET ORAL at 21:41

## 2018-10-23 RX ADMIN — ENOXAPARIN SODIUM 30 MG: 30 INJECTION SUBCUTANEOUS at 21:41

## 2018-10-23 RX ADMIN — PANTOPRAZOLE SODIUM 40 MG: 40 TABLET, DELAYED RELEASE ORAL at 06:02

## 2018-10-23 RX ADMIN — LEVOTHYROXINE SODIUM 200 MCG: 100 TABLET ORAL at 06:03

## 2018-10-23 RX ADMIN — ACETAMINOPHEN 1000 MG: 500 TABLET ORAL at 06:03

## 2018-10-23 RX ADMIN — DOCUSATE SODIUM 100 MG: 100 CAPSULE, LIQUID FILLED ORAL at 09:17

## 2018-10-23 RX ADMIN — OXYCODONE HYDROCHLORIDE 10 MG: 10 TABLET ORAL at 14:37

## 2018-10-23 RX ADMIN — ACYCLOVIR 400 MG: 200 CAPSULE ORAL at 21:39

## 2018-10-23 RX ADMIN — ATORVASTATIN CALCIUM 20 MG: 20 TABLET, FILM COATED ORAL at 09:17

## 2018-10-23 RX ADMIN — ACYCLOVIR 400 MG: 200 CAPSULE ORAL at 09:17

## 2018-10-23 RX ADMIN — ACYCLOVIR 400 MG: 200 CAPSULE ORAL at 14:36

## 2018-10-23 RX ADMIN — OXYCODONE HYDROCHLORIDE 10 MG: 10 TABLET ORAL at 21:39

## 2018-10-23 RX ADMIN — OXYCODONE HYDROCHLORIDE 10 MG: 10 TABLET ORAL at 09:24

## 2018-10-23 ASSESSMENT — PAIN DESCRIPTION - PAIN TYPE
TYPE: SURGICAL PAIN
TYPE: SURGICAL PAIN
TYPE: ACUTE PAIN;SURGICAL PAIN
TYPE: ACUTE PAIN;SURGICAL PAIN
TYPE: SURGICAL PAIN

## 2018-10-23 ASSESSMENT — PAIN SCALES - GENERAL
PAINLEVEL_OUTOF10: 0
PAINLEVEL_OUTOF10: 6
PAINLEVEL_OUTOF10: 7
PAINLEVEL_OUTOF10: 8
PAINLEVEL_OUTOF10: 0
PAINLEVEL_OUTOF10: 6
PAINLEVEL_OUTOF10: 4
PAINLEVEL_OUTOF10: 7
PAINLEVEL_OUTOF10: 7
PAINLEVEL_OUTOF10: 8
PAINLEVEL_OUTOF10: 7
PAINLEVEL_OUTOF10: 3
PAINLEVEL_OUTOF10: 3
PAINLEVEL_OUTOF10: 8
PAINLEVEL_OUTOF10: 3
PAINLEVEL_OUTOF10: 5
PAINLEVEL_OUTOF10: 4
PAINLEVEL_OUTOF10: 0
PAINLEVEL_OUTOF10: 7

## 2018-10-23 ASSESSMENT — PAIN DESCRIPTION - LOCATION
LOCATION: HIP
LOCATION: HIP;LEG
LOCATION: HIP;LEG
LOCATION: HIP
LOCATION: HIP

## 2018-10-23 ASSESSMENT — PAIN DESCRIPTION - PROGRESSION: CLINICAL_PROGRESSION: NOT CHANGED

## 2018-10-23 ASSESSMENT — PAIN DESCRIPTION - ORIENTATION
ORIENTATION: RIGHT

## 2018-10-23 ASSESSMENT — PAIN DESCRIPTION - FREQUENCY
FREQUENCY: CONTINUOUS
FREQUENCY: CONTINUOUS

## 2018-10-23 ASSESSMENT — PAIN DESCRIPTION - DESCRIPTORS
DESCRIPTORS: DULL;THROBBING;TIGHTNESS
DESCRIPTORS: DULL;THROBBING
DESCRIPTORS: ACHING;DULL;THROBBING

## 2018-10-23 NOTE — PATIENT CARE CONFERENCE
mobility  Rolling to Left: Supervision  Rolling to Right: Supervision  Supine to Sit: Supervision  Sit to Supine: Supervision  Scooting: Modified independent  Comment: VC's given for technique, increased time to perform. Leg  used for Sit<>supine. Functional Transfers: Toilet Transfers  Toilet - Technique: Ambulating (RW)  Equipment Used: Grab bars (comfort ht)  Toilet Transfer: Stand by assistance  Toilet Transfers Comments: RW <> comfort ht toilet with bilateral rails SBA, plans to get TSF for home. Tub Transfers  Tub - Transfer From: Rolling walker  Tub - Transfer Type: To and From  Tub - Transfer To: Transfer tub bench  Tub - Technique: Ambulating  Tub Transfers: Stand by assistance  Tub Transfers Comments: Dry transfer - cues for placement of shower curtain, may have to sit with back away from water, using hand held shower head from behind her due to commode being in the way of swinging her leg with leg . Prior to tub transfer, discussed shower chair in shower stall, but after OT simulating situation, pt determined there was not enough room to get the walker close to edge of stall to sit on chair. She was more satisfied by the transfer tub bench senerio  Shower Transfers  Shower - Transfer From: Ian Prakash (RW)  Shower - Transfer Type: To and From  Shower - Transfer To: Shower seat with back  Shower - Technique: Ambulating  Shower Transfers: Stand by assistance, Contact Guard  Shower Transfers Comments: sit & swing technique RW <> shower chair in stall SBA/CGA. Used grab bar. UE Function:  Tolerating 3# BUE there ex for AROM x 10 reps mult sets for overhead ex. Limited by fatigue. Issued green T-band HEP.       FIMS:  Eatin - Patient feeds self  Groomin - Patient independent with all grooming tasks  Bathin - Able to bathe all 10 areas with setup/sup/cues  Dressing-Upper: 5 - Requires setup/supervision/cues and/or requires assist with presthesis/brace only  Dressing-Lower: 5 -

## 2018-10-23 NOTE — PROGRESS NOTES
Grab bars (comfort ht)  Toilet Transfers Comments: RW <> comfort ht toilet with bilateral rails SBA, plans to get TSF for home. Assessment        SLP          Body mass index is 32.32 kg/m². Assessment and Plan:  Impairments: Decreased right hip ROM, limited weight bearing, decreased balance, decreased endurance     Right posterior column acetabular fracture - s/p ORIF (10/15 with Dr. Carloz Perea). Wound care. Pain control. DAVY. PT/OT.      Osteoporosis - Vitamin D supplementation     Acute blood loss anemia - Post-surgical. No signs of active bleeding. Monitor Hgb and for sx with therapies. Transfuse prn <7.      SOB with exertion - IS, increase endurance wit therapies. CXR at [facility] with concern for suprahilar mass. Consider repeat vs CT chest to further characterize.      HLD - statin     Hypothyroidism - Levothyroxine     H/o morbid obesity s/p gastrectomy sleeve - Dietician consult.      Bladder - high risk retention - Monitor PVRs, SC prn >300cc     Bowel - Constipation - Resolved. Change bowel regimen to prn.     Pain control - Scheduled acetaminophen, prn oxycodone     Safety - fall precautions     PPx  DVT: Lovenox x 6 weeks post-op  GI: pantoprazole     Rehab Progress: Tolerating therapy well thus far. Working on functional mobility, balance, compensatory strategies. Anticipated Dispo: home with significant other  Services/DME: TBD  ELOS: 10 days      Karan Polanco MD 10/23/2018, 3:12 PM

## 2018-10-23 NOTE — PROGRESS NOTES
Physical Therapy  Facility/Department: [address] REHAB  Daily Treatment Note  NAME: Leonides Lazaro  : 1955  MRN: [id number]    Date of Service: 10/23/2018    Discharge Recommendations:  Continue to assess pending progress, Patient would benefit from continued therapy after discharge, Home with Home health PT   PT Equipment Recommendations  Equipment Needed: No  Other: Will continue to assess DME needs     Patient Diagnosis(es): There were no encounter diagnoses. has a past medical history of Arthritis; Hyperlipidemia; Hypertension; and Thyroid disease. has a past surgical history that includes Abdomen surgery; Colonoscopy; fracture surgery; Dilatation, esophagus; Hysterectomy; and hernia repair. Restrictions  Restrictions/Precautions  Restrictions/Precautions: Weight Bearing  Required Braces or Orthoses?: No  Lower Extremity Weight Bearing Restrictions  Right Lower Extremity Weight Bearing: Flat Foot Weight Bearing  Partial Weight Bearing Percentage Or Pounds: foot all the way flat but no pressure (DAVY)  Subjective   General  Chart Reviewed: Yes  Response To Previous Treatment: Patient reporting fatigue but able to participate. Family / Caregiver Present: No  Referring Practitioner: Dr. Bridger Ross   Subjective  Subjective: Pt pleasant and agreeable to PT this AM. She reports fatigue after yesterdays therapy session but no overall complaints. Pain Screening  Patient Currently in Pain: Yes  Pain Assessment  Pain Assessment: 0-10  Pain Level: 6  Pain Type: Surgical pain  Pain Location: Hip  Pain Orientation: Right  Pain Descriptors: Aching;Dull; Throbbing  Pain Frequency: Continuous  Vital Signs  Patient Currently in Pain: Yes       Orientation  Orientation  Overall Orientation Status: Within Normal Limits  Objective   Bed mobility  Rolling to Left: Supervision  Rolling to Right: Supervision  Supine to Sit: Supervision  Sit to Supine: Supervision  Scooting: Modified independent  Comment: 's given for technique, increased time to perform. Leg  used for Sit<>supine. Transfers  Sit to Stand: Modified independent;Supervision  Stand to sit: Modified independent;Supervision  Bed to Chair: Supervision (Via ambulation with RW )  Car Transfer: Supervision  Ambulation  Ambulation?: Yes  WB Status: DAVY (weight of leg foot flat) WB R LE   Ambulation 1  Surface: level tile  Device: Rolling Walker  Assistance: Stand by assistance  Quality of Gait: Antalgic gait pattern 2/2 WB status and R LE pain. Step to pattern with decreased stance time on L LE 2/2 WB precautions. Able to lift R foot and advance R LE this session. Distance: 200' x2  Comments: Pt expressed L hip fatigue. Stairs/Curb  Stairs?: Yes  Stairs  # Steps : 4  Stairs Height: 6\"  Rails: Bilateral  Curbs: 6\"  Device: Rolling walker (RW used for curb step )  Assistance: Contact guard assistance  Comment: Pt ascend curb step and stairs with hop step backwards on L LE. Descended going forward leading with R LE. Balance  Posture: Good  Sitting - Static: Good  Sitting - Dynamic: Good  Standing - Static: Good;-  Standing - Dynamic: Fair;+  Comments: standing balance with bilateral UE support on RW                        PM Session  Subjective: Pt pleasant and agreeable to PT this afternoon No overall complaints but does state she is continuing to have R hip pain and rates it as a 6/10. Objective:  Sit<>Stand: Supervision  Steps: 10 steps with Standard Walker (adjusted to level of ground with front legs and level of first step with back legs) and SBA-CGA. Pt able to ascend steps with backwards hop and descend steps going forward. VC's for proper technique. Sit to supine: Supervision with leg   Rolling to Left: Supervision  Supine to sit: Supervision with leg   Supine Heel slides: x5 on R LE with ARROM until stretch felt on R quad. Stretch held for 45sec. Noted increased in R knee flexion at end of exercise.  Orange sheet under R )  Restraints  Initially in place: No     Therapy Time   Individual Concurrent Group Co-treatment   Time In 0945         Time Out 1030         Minutes 45         Timed Code Treatment Minutes: [address] Time     Individual Co-treatment   Time In 8941     Time Out 1430     Minutes 45         Electronically signed by Roslyn Valverde on 10/23/2018 at 4:18 PM  Therapist was present, directed the patient's care, made skilled judgement, and was responsible for assessment and treatment of the patient.

## 2018-10-23 NOTE — PROGRESS NOTES
Transfers Comments: RW <> comfort ht toilet with bilateral rails SBA, plans to get TSF for home. Shower Transfers  Shower - Transfer From: Angel Reveal (RW)  Shower - Transfer Type: To and From  Shower - Transfer To: Shower seat with back  Shower - Technique: Ambulating  Shower Transfers: Stand by assistance;Contact Guard  Shower Transfers Comments: sit & swing technique RW <> shower chair in stall SBA/CGA. Used grab bar. Wheelchair Bed Transfers  Wheelchair/Bed - Technique: Ambulating (RW)  Equipment Used: Bed;Wheelchair  Level of Asssistance: Stand by assistance  Wheelchair Transfers Comments: Tranferred from bed > RW <> w/c SBA with RW. Did well keeping RLE out in front during transitions. Bed mobility  Supine to Sit: Supervision (used leg , HOB elevated)  Scooting: Supervision;Modified independent                          Cognition  Overall Cognitive Status: WNL     Second Session:    Met in room, seated in recliner agreeable to tx. Rated RLE/hip pain 6/10. Requested toileting prior to going to dept. Pt requested to work on UE wt ex this session. ADL:  Pt amb <> toilet in bathroom bilateral rails,with RW voided, then > sink to wash hands > w/c all SBA/supervision. > dept via w/c. Transfers/mob: Transfers in dept with RW <> TTB in dry tub SBA with leg , OT reviewed how to cut slit in shower curtain to keep water inside of tub during shower. RW <> low BSC @ 16\" ht with SBA to simulate home toilet transfer as plans to get TSF. There ex: BUE 4# bicep curls, 4# scapular add/with ER(flexed elbows), 3# ex: chest press x 15, 3# overhead press x 10, 3# shoulder abd x 10, 3# shoulder flex x10, BUE holding 4# behind head elbow ext x10. There ex to increase strength for ADL/IADL/mob tasks keeping FFWB. Pt making excellent progress but continues to be limited by WBS & BUE weakness/decreased activity tolerance as c/o UEs quite fatigued after there ex with wts. Rec cont OT tx per POC.   Pt left in

## 2018-10-24 ENCOUNTER — APPOINTMENT (OUTPATIENT)
Dept: GENERAL RADIOLOGY | Age: 63
DRG: 560 | End: 2018-10-24
Attending: PHYSICAL MEDICINE & REHABILITATION
Payer: COMMERCIAL

## 2018-10-24 PROCEDURE — 6360000002 HC RX W HCPCS: Performed by: PHYSICAL MEDICINE & REHABILITATION

## 2018-10-24 PROCEDURE — 6370000000 HC RX 637 (ALT 250 FOR IP): Performed by: PHYSICAL MEDICINE & REHABILITATION

## 2018-10-24 PROCEDURE — 97535 SELF CARE MNGMENT TRAINING: CPT

## 2018-10-24 PROCEDURE — 97110 THERAPEUTIC EXERCISES: CPT

## 2018-10-24 PROCEDURE — 71045 X-RAY EXAM CHEST 1 VIEW: CPT

## 2018-10-24 PROCEDURE — 97530 THERAPEUTIC ACTIVITIES: CPT

## 2018-10-24 PROCEDURE — 1280000000 HC REHAB R&B

## 2018-10-24 PROCEDURE — 97116 GAIT TRAINING THERAPY: CPT

## 2018-10-24 RX ADMIN — DOCUSATE SODIUM 100 MG: 100 CAPSULE, LIQUID FILLED ORAL at 13:37

## 2018-10-24 RX ADMIN — PANTOPRAZOLE SODIUM 40 MG: 40 TABLET, DELAYED RELEASE ORAL at 05:36

## 2018-10-24 RX ADMIN — ACYCLOVIR 400 MG: 200 CAPSULE ORAL at 10:29

## 2018-10-24 RX ADMIN — ACYCLOVIR 400 MG: 200 CAPSULE ORAL at 16:14

## 2018-10-24 RX ADMIN — OXYCODONE HYDROCHLORIDE 10 MG: 10 TABLET ORAL at 13:21

## 2018-10-24 RX ADMIN — ACYCLOVIR 400 MG: 200 CAPSULE ORAL at 21:50

## 2018-10-24 RX ADMIN — ENOXAPARIN SODIUM 30 MG: 30 INJECTION SUBCUTANEOUS at 10:29

## 2018-10-24 RX ADMIN — LEVOTHYROXINE SODIUM 200 MCG: 100 TABLET ORAL at 05:36

## 2018-10-24 RX ADMIN — ACETAMINOPHEN 1000 MG: 500 TABLET ORAL at 05:36

## 2018-10-24 RX ADMIN — OXYCODONE HYDROCHLORIDE 10 MG: 10 TABLET ORAL at 05:40

## 2018-10-24 RX ADMIN — OXYCODONE HYDROCHLORIDE 5 MG: 5 TABLET ORAL at 21:53

## 2018-10-24 RX ADMIN — ACETAMINOPHEN 1000 MG: 500 TABLET ORAL at 13:21

## 2018-10-24 RX ADMIN — ATORVASTATIN CALCIUM 20 MG: 20 TABLET, FILM COATED ORAL at 10:29

## 2018-10-24 RX ADMIN — ACETAMINOPHEN 1000 MG: 500 TABLET ORAL at 21:50

## 2018-10-24 RX ADMIN — ENOXAPARIN SODIUM 30 MG: 30 INJECTION SUBCUTANEOUS at 21:51

## 2018-10-24 ASSESSMENT — PAIN DESCRIPTION - ORIENTATION
ORIENTATION: RIGHT

## 2018-10-24 ASSESSMENT — PAIN SCALES - GENERAL
PAINLEVEL_OUTOF10: 4
PAINLEVEL_OUTOF10: 7
PAINLEVEL_OUTOF10: 7
PAINLEVEL_OUTOF10: 3
PAINLEVEL_OUTOF10: 3
PAINLEVEL_OUTOF10: 4
PAINLEVEL_OUTOF10: 7
PAINLEVEL_OUTOF10: 6
PAINLEVEL_OUTOF10: 4

## 2018-10-24 ASSESSMENT — PAIN DESCRIPTION - DESCRIPTORS
DESCRIPTORS: ACHING;DISCOMFORT
DESCRIPTORS: THROBBING;DULL
DESCRIPTORS: ACHING;THROBBING;DULL

## 2018-10-24 ASSESSMENT — PAIN DESCRIPTION - PAIN TYPE
TYPE: SURGICAL PAIN

## 2018-10-24 ASSESSMENT — PAIN DESCRIPTION - PROGRESSION
CLINICAL_PROGRESSION: NOT CHANGED

## 2018-10-24 ASSESSMENT — PAIN DESCRIPTION - FREQUENCY
FREQUENCY: CONTINUOUS

## 2018-10-24 ASSESSMENT — PAIN DESCRIPTION - LOCATION
LOCATION: HIP
LOCATION: HIP
LOCATION: HIP;LEG

## 2018-10-24 NOTE — PLAN OF CARE
Problem: Pain:  Goal: Pain level will decrease  Pain level will decrease   Outcome: Ongoing  Patient uses pain scale appropriately. Problem: Infection - Surgical Site:  Goal: Will show no infection signs and symptoms  Will show no infection signs and symptoms   Outcome: Ongoing  Skin assessment performed for signs and symptoms of infection. Problem: Discharge Planning:  Goal: Discharged to appropriate level of care  Discharged to appropriate level of care   Outcome: Ongoing  Discharge planning started upon admission,  consult in place. Problem: Infection - Surgical Site:  Goal: Will show no infection signs and symptoms  Will show no infection signs and symptoms   Outcome: Ongoing  Skin assessment performed for signs and symptoms of infection. Problem: Mobility - Impaired:  Goal: Mobility will improve to maximum level  Mobility will improve to maximum level   Outcome: Ongoing  Participates in all scheduled therapies, is cooperative and has a positive attitude towards achieving set goals. Problem: Skin Integrity:  Goal: Will show no infection signs and symptoms  Will show no infection signs and symptoms   Outcome: Ongoing  Skin assessment performed for signs and symptoms of infection. Problem: Falls - Risk of:  Goal: Will remain free from falls  Will remain free from falls   Outcome: Ongoing  Patient free of falls this shift, all safety precautions in place. Problem: Musculor/Skeletal Functional Status  Goal: Highest potential functional level  Outcome: Ongoing  Participates in all scheduled therapy sessions, has a positive attitude towards reaching goals set forth during admission, and is improving steadily toward the anticipated discharge date. Is compliant with safety.

## 2018-10-24 NOTE — PROGRESS NOTES
Department of Physical Medicine & Rehabilitation  Progress Note    Patient Identification:  Serge No  [id number]  : 1955  Admit date: 10/18/2018    Chief Complaint: Closed displaced fracture of posterior column of right acetabulum (HCC)    Subjective:   No acute events overnight. Patient seen this am sitting up in room and eating breakfast. She is feeling well. We discussed findings on outside hospital CXR. She reports she was not informed of this. Willing to repeat CXR to further evaluate. ROS: No f/c, n/v, cp     Objective:  Patient Vitals for the past 24 hrs:   BP Temp Temp src Pulse Resp SpO2 Weight   10/24/18 0525 119/81 98.4 °F (36.9 °C) Oral 83 17 93 % 187 lb 2.7 oz (84.9 kg)   10/23/18 1457 107/69 98.5 °F (36.9 °C) Oral 87 16 95 % -   10/23/18 0835 - - - - - 94 % -     Const: Alert. No distress, pleasant. HEENT: Normocephalic, atraumatic. Normal sclera/conjunctiva. MMM. CV: Regular rate and rhythm. Resp: No respiratory distress. Lungs CTAB. Abd: NABS+. Soft, nontender, nondistended   Ext: No edema. MSK: Right hip ROM limited. Incisions c/d/i. Neuro: Alert, oriented, appropriately interactive. Psych: Cooperative, appropriate mood and affect    Laboratory data: Available via EMR. Last 24 hour lab  No results found for this or any previous visit (from the past 24 hour(s)).     Therapy progress:  PT     Objective     Sit to Stand: Modified independent, Supervision  Stand to sit: Modified independent, Supervision  Bed to Chair: Supervision (Via ambulation with RW )  Stand Pivot Transfers: Contact guard assistance (With RW )  Device: Rolling Walker  Assistance: Stand by assistance  Distance: 210' x2  OT  PT Equipment Recommendations  Equipment Needed: No  Other: Will continue to assess DME needs   Toilet - Technique: Ambulating (RW)  Equipment Used: Grab bars (comfort ht toilet)  Toilet Transfers Comments: RW <> comfort ht toilet with bilateral rails Supervision, plans to get TSF

## 2018-10-25 ENCOUNTER — APPOINTMENT (OUTPATIENT)
Dept: CT IMAGING | Age: 63
DRG: 560 | End: 2018-10-25
Attending: PHYSICAL MEDICINE & REHABILITATION
Payer: COMMERCIAL

## 2018-10-25 LAB
ANION GAP SERPL CALCULATED.3IONS-SCNC: 10 MMOL/L (ref 3–16)
BASOPHILS ABSOLUTE: 0 K/UL (ref 0–0.2)
BASOPHILS RELATIVE PERCENT: 0.4 %
BUN BLDV-MCNC: 12 MG/DL (ref 7–20)
CALCIUM SERPL-MCNC: 8.7 MG/DL (ref 8.3–10.6)
CHLORIDE BLD-SCNC: 105 MMOL/L (ref 99–110)
CO2: 26 MMOL/L (ref 21–32)
CREAT SERPL-MCNC: <0.5 MG/DL (ref 0.6–1.2)
EOSINOPHILS ABSOLUTE: 0.4 K/UL (ref 0–0.6)
EOSINOPHILS RELATIVE PERCENT: 5.4 %
GFR AFRICAN AMERICAN: >60
GFR NON-AFRICAN AMERICAN: >60
GLUCOSE BLD-MCNC: 91 MG/DL (ref 70–99)
HCT VFR BLD CALC: 28.7 % (ref 36–48)
HEMOGLOBIN: 9.6 G/DL (ref 12–16)
LYMPHOCYTES ABSOLUTE: 1.7 K/UL (ref 1–5.1)
LYMPHOCYTES RELATIVE PERCENT: 25.1 %
MCH RBC QN AUTO: 29.3 PG (ref 26–34)
MCHC RBC AUTO-ENTMCNC: 33.4 G/DL (ref 31–36)
MCV RBC AUTO: 87.8 FL (ref 80–100)
MONOCYTES ABSOLUTE: 0.7 K/UL (ref 0–1.3)
MONOCYTES RELATIVE PERCENT: 10.6 %
NEUTROPHILS ABSOLUTE: 4 K/UL (ref 1.7–7.7)
NEUTROPHILS RELATIVE PERCENT: 58.5 %
PDW BLD-RTO: 13.8 % (ref 12.4–15.4)
PLATELET # BLD: 433 K/UL (ref 135–450)
PMV BLD AUTO: 8.6 FL (ref 5–10.5)
POTASSIUM REFLEX MAGNESIUM: 4 MMOL/L (ref 3.5–5.1)
RBC # BLD: 3.26 M/UL (ref 4–5.2)
SODIUM BLD-SCNC: 141 MMOL/L (ref 136–145)
WBC # BLD: 6.9 K/UL (ref 4–11)

## 2018-10-25 PROCEDURE — 97116 GAIT TRAINING THERAPY: CPT

## 2018-10-25 PROCEDURE — 71260 CT THORAX DX C+: CPT

## 2018-10-25 PROCEDURE — 97535 SELF CARE MNGMENT TRAINING: CPT

## 2018-10-25 PROCEDURE — 6360000004 HC RX CONTRAST MEDICATION: Performed by: PHYSICAL MEDICINE & REHABILITATION

## 2018-10-25 PROCEDURE — 36415 COLL VENOUS BLD VENIPUNCTURE: CPT

## 2018-10-25 PROCEDURE — 97530 THERAPEUTIC ACTIVITIES: CPT

## 2018-10-25 PROCEDURE — 6360000002 HC RX W HCPCS: Performed by: PHYSICAL MEDICINE & REHABILITATION

## 2018-10-25 PROCEDURE — 1280000000 HC REHAB R&B

## 2018-10-25 PROCEDURE — 97110 THERAPEUTIC EXERCISES: CPT

## 2018-10-25 PROCEDURE — 6370000000 HC RX 637 (ALT 250 FOR IP): Performed by: PHYSICAL MEDICINE & REHABILITATION

## 2018-10-25 PROCEDURE — 80048 BASIC METABOLIC PNL TOTAL CA: CPT

## 2018-10-25 PROCEDURE — 85025 COMPLETE CBC W/AUTO DIFF WBC: CPT

## 2018-10-25 RX ORDER — POLYETHYLENE GLYCOL 3350 17 G/17G
17 POWDER, FOR SOLUTION ORAL DAILY PRN
Qty: 527 G | Refills: 0 | Status: SHIPPED | OUTPATIENT
Start: 2018-10-25 | End: 2018-11-24

## 2018-10-25 RX ORDER — OXYCODONE HYDROCHLORIDE 5 MG/1
5 TABLET ORAL EVERY 8 HOURS PRN
Qty: 20 TABLET | Refills: 0 | Status: SHIPPED | OUTPATIENT
Start: 2018-10-25 | End: 2018-11-01

## 2018-10-25 RX ORDER — PSEUDOEPHEDRINE HCL 30 MG
100 TABLET ORAL 2 TIMES DAILY PRN
Qty: 60 CAPSULE | Refills: 0 | Status: SHIPPED | OUTPATIENT
Start: 2018-10-25

## 2018-10-25 RX ADMIN — ACETAMINOPHEN 1000 MG: 500 TABLET ORAL at 14:05

## 2018-10-25 RX ADMIN — OXYCODONE HYDROCHLORIDE 10 MG: 10 TABLET ORAL at 21:29

## 2018-10-25 RX ADMIN — PANTOPRAZOLE SODIUM 40 MG: 40 TABLET, DELAYED RELEASE ORAL at 06:18

## 2018-10-25 RX ADMIN — ENOXAPARIN SODIUM 30 MG: 30 INJECTION SUBCUTANEOUS at 07:46

## 2018-10-25 RX ADMIN — ACETAMINOPHEN 1000 MG: 500 TABLET ORAL at 21:29

## 2018-10-25 RX ADMIN — ACYCLOVIR 400 MG: 200 CAPSULE ORAL at 21:29

## 2018-10-25 RX ADMIN — DOCUSATE SODIUM 100 MG: 100 CAPSULE, LIQUID FILLED ORAL at 06:18

## 2018-10-25 RX ADMIN — ENOXAPARIN SODIUM 30 MG: 30 INJECTION SUBCUTANEOUS at 21:29

## 2018-10-25 RX ADMIN — OXYCODONE HYDROCHLORIDE 10 MG: 10 TABLET ORAL at 12:25

## 2018-10-25 RX ADMIN — ACETAMINOPHEN 1000 MG: 500 TABLET ORAL at 06:18

## 2018-10-25 RX ADMIN — ATORVASTATIN CALCIUM 20 MG: 20 TABLET, FILM COATED ORAL at 07:45

## 2018-10-25 RX ADMIN — ACYCLOVIR 400 MG: 200 CAPSULE ORAL at 14:05

## 2018-10-25 RX ADMIN — ACYCLOVIR 400 MG: 200 CAPSULE ORAL at 07:44

## 2018-10-25 RX ADMIN — OXYCODONE HYDROCHLORIDE 10 MG: 10 TABLET ORAL at 07:45

## 2018-10-25 RX ADMIN — IOPAMIDOL 75 ML: 755 INJECTION, SOLUTION INTRAVENOUS at 11:44

## 2018-10-25 RX ADMIN — OXYCODONE HYDROCHLORIDE 5 MG: 5 TABLET ORAL at 03:21

## 2018-10-25 RX ADMIN — LEVOTHYROXINE SODIUM 200 MCG: 100 TABLET ORAL at 06:18

## 2018-10-25 ASSESSMENT — PAIN DESCRIPTION - PROGRESSION: CLINICAL_PROGRESSION: NOT CHANGED

## 2018-10-25 ASSESSMENT — PAIN SCALES - GENERAL
PAINLEVEL_OUTOF10: 3
PAINLEVEL_OUTOF10: 4
PAINLEVEL_OUTOF10: 2
PAINLEVEL_OUTOF10: 0
PAINLEVEL_OUTOF10: 6
PAINLEVEL_OUTOF10: 6
PAINLEVEL_OUTOF10: 7
PAINLEVEL_OUTOF10: 5

## 2018-10-25 ASSESSMENT — PAIN DESCRIPTION - LOCATION
LOCATION: HIP
LOCATION: HIP

## 2018-10-25 ASSESSMENT — PAIN DESCRIPTION - DESCRIPTORS: DESCRIPTORS: ACHING;SHOOTING

## 2018-10-25 ASSESSMENT — PAIN DESCRIPTION - PAIN TYPE: TYPE: SURGICAL PAIN

## 2018-10-25 ASSESSMENT — PAIN DESCRIPTION - ORIENTATION
ORIENTATION: RIGHT
ORIENTATION: RIGHT

## 2018-10-25 ASSESSMENT — PAIN DESCRIPTION - FREQUENCY: FREQUENCY: CONTINUOUS

## 2018-10-25 NOTE — PROGRESS NOTES
Pt A&O x 3, pleasant and cooperative. Pt was given room independence today. MD aware and agreeable. Pt has received pain meds PRN per request/order. Will continue to monitor.

## 2018-10-25 NOTE — PROGRESS NOTES
Department of Physical Medicine & Rehabilitation  Progress Note    Patient Identification:  Omar Mccollum  [id number]  : 1955  Admit date: 10/18/2018    Chief Complaint: Closed displaced fracture of posterior column of right acetabulum (HCC)    Subjective:   No acute events overnight. Patient seen this afternoon sitting up in room. She is feeling well and looking forward to dc tomorrow. Informed her of CT chest results. ROS: No f/c, n/v, cp     Objective:  Patient Vitals for the past 24 hrs:   BP Temp Temp src Pulse Resp SpO2   10/25/18 0319 (!) 155/78 98.3 °F (36.8 °C) Oral 84 18 96 %   10/24/18 1450 123/78 98.5 °F (36.9 °C) Oral 86 18 95 %     Const: Alert. No distress, pleasant. HEENT: Normocephalic, atraumatic. Normal sclera/conjunctiva. MMM. CV: Regular rate and rhythm. Resp: No respiratory distress. Lungs CTAB. Abd: NABS+. Soft, nontender, nondistended   Ext: No edema. MSK: Right hip ROM limited. Incisions c/d/i. Neuro: Alert, oriented, appropriately interactive. Psych: Cooperative, appropriate mood and affect    Laboratory data: Available via EMR.    Last 24 hour lab  Recent Results (from the past 24 hour(s))   CBC Auto Differential    Collection Time: 10/25/18  6:28 AM   Result Value Ref Range    WBC 6.9 4.0 - 11.0 K/uL    RBC 3.26 (L) 4.00 - 5.20 M/uL    Hemoglobin 9.6 (L) 12.0 - 16.0 g/dL    Hematocrit 28.7 (L) 36.0 - 48.0 %    MCV 87.8 80.0 - 100.0 fL    MCH 29.3 26.0 - 34.0 pg    MCHC 33.4 31.0 - 36.0 g/dL    RDW 13.8 12.4 - 15.4 %    Platelets 579 586 - 054 K/uL    MPV 8.6 5.0 - 10.5 fL    Neutrophils % 58.5 %    Lymphocytes % 25.1 %    Monocytes % 10.6 %    Eosinophils % 5.4 %    Basophils % 0.4 %    Neutrophils # 4.0 1.7 - 7.7 K/uL    Lymphocytes # 1.7 1.0 - 5.1 K/uL    Monocytes # 0.7 0.0 - 1.3 K/uL    Eosinophils # 0.4 0.0 - 0.6 K/uL    Basophils # 0.0 0.0 - 0.2 K/uL   Basic Metabolic Panel w/ Reflex to MG    Collection Time: 10/25/18  6:28 AM   Result Value Ref Range Constipation - Resolved. Change bowel regimen to prn.     Pain control - Scheduled acetaminophen, prn oxycodone     Safety - fall precautions     PPx  DVT: Lovenox x 6 weeks post-op (stop 11/26)  GI: pantoprazole     Rehab Progress: On track for dc tomorrow. Patient making good progress with functional mobility, balance, compensatory strategies. Nikhil in room today. Anticipated Dispo: home with fiance. Services:  PT, OT, Nurse  DME: Rolling walker, tub transfer bench, toilet safety frame  ELOS: 10/26    Travis E Veronica Boucher.  Cliff Polanco MD 10/25/2018, 1:53 PM

## 2018-10-25 NOTE — CARE COORDINATION
[facility] received referral from  for:    Zamzam Conway Basket  Toilet Safety Frame  Transfer Tub Bench  4-pc Hip Kit. Received MD Order & C-9. Will deliver items to patient's room prior to discharge on Friday.     Thank you for the referral.  Electronically signed by Reyna Zuñiga on 10/25/2018 at 3:21 PM Cell ph# [phone]

## 2018-10-25 NOTE — PLAN OF CARE
Problem: Pain:  Goal: Pain level will decrease  Pain level will decrease   Outcome: Ongoing  Assess pts level of pain. Medicate as ordered for pain. Evaluate effectiveness of pain med given. Notify physician if pain not controlled. Problem: Infection - Surgical Site:  Goal: Will show no infection signs and symptoms  Will show no infection signs and symptoms   Outcome: Ongoing  Pt is showing not signs and symptoms of infection. Patient educated on signs and symptoms of infection to watch out for. Patient is agreeable to notify nurse if signs start to show. Handwashing is performed upon entering patients to reduce the risk of infection. Will continue to assess and monitor. Problem: Skin Integrity:  Goal: Absence of new skin breakdown  Absence of new skin breakdown   Outcome: Ongoing  Pt is at risk for impaired skin integrity. Assess skin every shift and prn. Keep heels off bed. Keep skin clean and dry. Goal: Will show no infection signs and symptoms  Will show no infection signs and symptoms   Outcome: Ongoing  Pt is showing not signs and symptoms of infection. Patient educated on signs and symptoms of infection to watch out for. Patient is agreeable to notify nurse if signs start to show. Handwashing is performed upon entering patients to reduce the risk of infection. Will continue to assess and monitor.

## 2018-10-25 NOTE — PROGRESS NOTES
Physical Therapy  Facility/Department: [address] IP REHAB  Daily Treatment Note  NAME: William Jones  : 1955  MRN: [id number]    Date of Service: 10/25/2018    Discharge Recommendations:  Continue to assess pending progress, Patient would benefit from continued therapy after discharge, Home with Home health PT   PT Equipment Recommendations  Equipment Needed: No  Other: Will continue to assess DME needs     Patient Diagnosis(es): The encounter diagnosis was Closed displaced fracture of posterior column of right acetabulum, initial encounter ([facility] [address] has a past medical history of Arthritis; Hyperlipidemia; Hypertension; and Thyroid disease. has a past surgical history that includes Abdomen surgery; Colonoscopy; fracture surgery; Dilatation, esophagus; Hysterectomy; and hernia repair. Social/Functional History  Lives With: Significant other (Lives with Chaitanya Dover)  Type of Home: House  Home Layout: Two level, Laundry in basement, Bed/Bath upstairs, 1/2 bath on main level (Two level with basement. Full flight upstairs with railing on left and partial railing on right. Full flight of steps to the basement with unilateral railing on right. )  Home Access: Stairs to enter without rails (1+2+1 if entering from front of house. 3 steps if entering from garage. )  Entrance Stairs - Number of Steps: 1 (with landing)+2 (with landing)+1 from front enterance  Bathroom Shower/Tub: Doors, Walk-in shower (Also has a tub shower in second bathroom)  Bathroom Toilet: Standard (sink counter on R side)  Bathroom Equipment:  (Vanity next to toilet )  Bathroom Accessibility: Walker accessible  Home Equipment: [place] Help From: Other (comment) (Recieves help from Kenia Owen who would do the majority of cooking.  Hires someone to come and cut grass)  ADL Assistance: Independent  Homemaking Assistance: Independent  Homemaking Responsibilities: Yes  Meal Prep Responsibility: Primary  Laundry Responsibility:

## 2018-10-25 NOTE — DISCHARGE SUMMARY
Physical Therapy  Discharge Summary    Name:Leanne Hyde  [facility]:[id number]  :1955  Treatment Diagnosis: Decreased fucntional mobility s/p ORIF of Right posterior column acetabular fx. Diagnosis: Closed displaced fracture of posterior column of right acetabulum     Restrictions/Precautions  Restrictions/Precautions: Weight Bearing, Up Ad Erendira  Required Braces or Orthoses?: No   Lower Extremity Weight Bearing Restrictions  Right Lower Extremity Weight Bearing: Flat Foot Weight Bearing  Partial Weight Bearing Percentage Or Pounds: foot all the way flat but no pressure (DAVY)             Goals:                  Short term goals  Time Frame for Short term goals: Approximately 1 week from 10/19/18  Short term goal 1: Bed mobility with Mod I- met   Short term goal 2: Transfers with Mod I - met   Short term goal 3: Ambulate 150' with LRAD and Mod I - met   Short term goal 4: Ascend/descend curb step with LRAD and supervision - met   Short term goal 5: Ascend/descend 12 steps with unilateral and supervision (Anticiapte pt will need to ascend steps on buttocks)- met             Long term goals  Time Frame for Long term goals : STG=LTG    Pt. Met 5/5 short term goals. Pt. Currently ambulates 315 feet with RW and mod I  Up/down 12 steps with adjusted walker ascending backward/descending forward maintaining WB well with supervision. Requires set up assistance for walker  Up/down curb step with RW supervision, ascending backwards to maintain precuations  Sit to/from stand with mod I   Bed mobility with mod I   Recommend home PT  in order to promote safety wit hsteps, independence with all functional mobility, overall increased strength, and return to PLOF  Pt. Safe to return home with assistance from family PRN.    HEP: Pt provided with HEP of supine heelslides, supine hip abd/ad, supine SAQ'S, supine glute squeezes, supine HS sets, supine ankle pumps, seated LAQ's, standing hip abd/add flex/ext, standing

## 2018-10-25 NOTE — DISCHARGE INSTR - COC
Continuity of Care Form    Patient Name: Milan Gimenez   :  1955  MRN:  [id number]    Admit date:  10/18/2018  Discharge date:  10/26/18    Code Status Order: Full Code   Advance Directives:   [facility] Documentation     Date/Time Healthcare Directive Type of Healthcare Directive Copy in [facility] [address] Agent's Name Healthcare Agent's Phone Number    10/19/18 1301  No, patient does not have an advance directive for healthcare treatment -- -- -- -- --    10/18/18 2141  No, patient does not have an advance directive for healthcare treatment -- -- -- -- --          Admitting Physician:  Merlynn Schaumann, MD  PCP: Merlynn Schaumann, MD    Discharging Nurse: [facility] Unit/Room#: [address] [facility] Unit Phone Number: [phone]    Emergency Contact:   Extended Emergency Contact Information  Primary Emergency Contact: Leonardo Lind   [address] Phone: [phone]  Work Phone: [phone]  Mobile Phone: [phone]  Relation: Domestic Partner  Secondary Emergency Contact: [address] Phone: [phone]  Work Phone: [phone]  Mobile Phone: [phone]  Relation: Child    Past Surgical History:  Past Surgical History:   Procedure Laterality Date    ABDOMEN SURGERY      COLONOSCOPY      DILATATION, ESOPHAGUS      FRACTURE SURGERY      HERNIA REPAIR      HYSTERECTOMY         Immunization History:   Immunization History   Administered Date(s) Administered    Influenza, Quadv, 6 mo and older, IM, PF (Flulaval, Fluarix) 10/20/2018       Active Problems:  Patient Active Problem List   Diagnosis Code    Closed displaced fracture of posterior column of right acetabulum (HCC) S32.441A       Isolation/Infection:   Isolation          No Isolation            Nurse Assessment:  Last Vital Signs: BP (!) 155/78   Pulse 84   Temp 98.3 °F (36.8 °C) (Oral)   Resp 18   Ht 5' 4\" (1.626 m)   Wt 187 lb 2.7 oz (84.9 kg)   LMP  (LMP Unknown)   SpO2 96%   Breastfeeding? No   BMI 32.13 kg/m²     Last documented pain score (0-10 scale): Pain Level: 4  Last Weight:   Wt Readings from Last 1 Encounters:   10/24/18 187 lb 2.7 oz (84.9 kg)     Mental Status:  oriented, alert and thought processes intact    IV Access:  - None    Nursing Mobility/ADLs:  Walking   Independent  Transfer  Independent  Bathing  Independent  Dressing  Independent  Toileting  Independent  Feeding  Independent  Med [address]  Med Delivery   whole    Wound Care Documentation and Therapy:  Wound 10/18/18 Other (Comment) Knee Inner (Active)   Wound Type Incision 10/24/2018  9:50 PM   Wound Other 10/24/2018 10:38 AM   Dressing Status Clean;Dry; Intact 10/24/2018  9:50 PM   Dressing Changed Changed/New 10/24/2018 10:38 AM   Dressing/Treatment Silicone dressing 63/77/6549 10:38 AM   Wound Cleansed Rinsed/Irrigated with saline 10/20/2018  8:19 PM   Dressing Change Due 10/27/18 10/24/2018 10:38 AM   Number of days: 6       Incision 10/18/18 Hip Right (Active)   Wound Assessment Clean;Dry;Pink 10/24/2018 10:38 AM   Arleth-wound Assessment Clean;Dry 10/24/2018 10:38 AM   Closure Staples 10/24/2018 10:38 AM   Culture Taken No 10/18/2018  9:56 PM   Drainage Amount None 10/24/2018 10:38 AM   Drainage Description Serosanguinous 10/21/2018  9:00 AM   Odor None 10/24/2018 10:38 AM   Dressing/Treatment Non adherent 10/24/2018  9:50 PM   Dressing Changed Changed/New 10/24/2018 10:38 AM   Dressing Status Clean;Dry; Intact 10/24/2018  9:50 PM   Dressing Change Due 10/27/18 10/24/2018 10:38 AM   Number of days: 6        Elimination:  Continence:   · Bowel:  Yes  · Bladder: Yes  Urinary Catheter: None   Colostomy/Ileostomy/Ileal Conduit: No       Date of Last BM: 10/25    Intake/Output Summary (Last 24 hours) at 10/25/18 1043  Last data filed at 10/25/18 0751   Gross per 24 hour   Intake              480 ml   Output                0 ml

## 2018-10-26 VITALS
HEART RATE: 78 BPM | OXYGEN SATURATION: 96 % | BODY MASS INDEX: 31.88 KG/M2 | TEMPERATURE: 98.5 F | RESPIRATION RATE: 16 BRPM | HEIGHT: 64 IN | DIASTOLIC BLOOD PRESSURE: 76 MMHG | WEIGHT: 186.73 LBS | SYSTOLIC BLOOD PRESSURE: 120 MMHG

## 2018-10-26 PROCEDURE — 6360000002 HC RX W HCPCS: Performed by: PHYSICAL MEDICINE & REHABILITATION

## 2018-10-26 PROCEDURE — 6370000000 HC RX 637 (ALT 250 FOR IP): Performed by: PHYSICAL MEDICINE & REHABILITATION

## 2018-10-26 RX ADMIN — OXYCODONE HYDROCHLORIDE 10 MG: 10 TABLET ORAL at 12:33

## 2018-10-26 RX ADMIN — ACETAMINOPHEN 1000 MG: 500 TABLET ORAL at 06:09

## 2018-10-26 RX ADMIN — OXYCODONE HYDROCHLORIDE 10 MG: 10 TABLET ORAL at 06:05

## 2018-10-26 RX ADMIN — LEVOTHYROXINE SODIUM 200 MCG: 100 TABLET ORAL at 06:05

## 2018-10-26 RX ADMIN — PANTOPRAZOLE SODIUM 40 MG: 40 TABLET, DELAYED RELEASE ORAL at 06:05

## 2018-10-26 RX ADMIN — ACYCLOVIR 400 MG: 200 CAPSULE ORAL at 08:59

## 2018-10-26 RX ADMIN — ACETAMINOPHEN 1000 MG: 500 TABLET ORAL at 12:33

## 2018-10-26 RX ADMIN — ACYCLOVIR 400 MG: 200 CAPSULE ORAL at 12:33

## 2018-10-26 RX ADMIN — ATORVASTATIN CALCIUM 20 MG: 20 TABLET, FILM COATED ORAL at 08:59

## 2018-10-26 RX ADMIN — ENOXAPARIN SODIUM 30 MG: 30 INJECTION SUBCUTANEOUS at 08:59

## 2018-10-26 ASSESSMENT — PAIN SCALES - GENERAL
PAINLEVEL_OUTOF10: 7
PAINLEVEL_OUTOF10: 7
PAINLEVEL_OUTOF10: 0
PAINLEVEL_OUTOF10: 7

## 2018-10-26 ASSESSMENT — PAIN DESCRIPTION - LOCATION: LOCATION: HIP

## 2018-10-26 ASSESSMENT — PAIN DESCRIPTION - ORIENTATION: ORIENTATION: RIGHT

## 2018-10-26 ASSESSMENT — PAIN DESCRIPTION - PAIN TYPE: TYPE: SURGICAL PAIN

## 2018-10-26 NOTE — DISCHARGE SUMMARY
feeds self  Groomin - Patient independent with all grooming tasks  Bathin - Able to bathe all 10 areas with device  Dressing-Upper: 6 - Independent with device/prosthesis  Dressing-Lower: 6 - Independent with device/prosthesis  Toiletin - Patient independent with all 3 tasks  Toilet Transfer: 6 - Independent with device (grab bar/walker/slide bar)  Shower Transfer: 6 - Modified independence,  , Assessment: Pt performed ADL shower session @ mod ind level including set-up from with RW. Safe to be room ind with RW. Plan cont. OT tx on rehab per POC with D/C home tomorrow with home OT. Speech therapy:  Comprehension: 7 - Patient understands complex ideas (math/planning)  Expression: 7 - Patient expresses complex ideas/needs  Social Interaction: 7 - Patient has appropriate behavior/relations 100% of the time  Problem Solvin - Patient independent with complex tasks  Memory: 7 - Patient independent with meds/people/schedule      Significant Diagnostics:   Lab Results   Component Value Date    CREATININE <0.5 (L) 10/25/2018    BUN 12 10/25/2018     10/25/2018    K 4.0 10/25/2018     10/25/2018    CO2 26 10/25/2018       Lab Results   Component Value Date    WBC 6.9 10/25/2018    HGB 9.6 (L) 10/25/2018    HCT 28.7 (L) 10/25/2018    MCV 87.8 10/25/2018     10/25/2018       Disposition:  Home with fiance.    Services:  PT, OT, Nurse  DME: Rolling walker, tub transfer bench, toilet safety frame, hip kit    Discharge Condition: Stable    Follow-up:  See after visit summary from hospitalization    Discharge Medications:     Medication List      START taking these medications    docusate 100 MG Caps  Commonly known as:  COLACE, DULCOLAX  Take 100 mg by mouth 2 times daily as needed for Constipation     enoxaparin 30 MG/0.3ML injection  Commonly known as:  LOVENOX  Inject 0.3 mLs into the skin 2 times daily  Notes to patient:  Blood thinner     oxyCODONE 5 MG immediate release tablet  Commonly known as:  ROXICODONE  Take 1 tablet by mouth every 8 hours as needed for Pain for up to 7 days. .  Notes to patient:  Pain medication     polyethylene glycol packet  Commonly known as:  GLYCOLAX  Take 17 g by mouth daily as needed for Constipation  Notes to patient:  laxative        CONTINUE taking these medications    atorvastatin 40 MG tablet  Commonly known as:  LIPITOR     ergocalciferol 61251 units capsule  Commonly known as:  ERGOCALCIFEROL     levothyroxine 200 MCG tablet  Commonly known as:  SYNTHROID     valACYclovir 500 MG tablet  Commonly known as:  VALTREX        STOP taking these medications    vitamin D 1000 UNIT Tabs tablet  Commonly known as:  CHOLECALCIFEROL           Where to Get Your Medications      You can get these medications from any pharmacy    Bring a paper prescription for each of these medications  · docusate 100 MG Caps  · enoxaparin 30 MG/0.3ML injection  · oxyCODONE 5 MG immediate release tablet  · polyethylene glycol packet           I spent over 35 minutes on this discharge encounter between counseling, coordination of care, and medication reconciliation.       Fabiola Rodriguez MD

## 2018-10-26 NOTE — CARE COORDINATION
SOCIAL WORK DISCHARGE SUMMARY:      DISCHARGE DATE:                 10/26/18      DISCHARGE PLACE:                Home      HOME CARE AGENCY:            [facility] (Coordinated by [employer])             PHONE NUMBER          [phone]   [facility]                 [phone]      TRANSPORTATION:                [employer]             TIME:                              10-12 noon      PREFERRED PHARMACY:     [facility]    MD Orders: PT/OT/SN    DME: Rocky Emmanuel, Sai Trevizo, Toilet Safety Frame, Transfer Tub Bench, 4-pc Hip Kit from [employer] signed by JERSON Daily on 10/26/2018 at 9:03 AM

## 2018-10-26 NOTE — PROGRESS NOTES
CLINICAL PHARMACY NOTE: MEDS TO [address] Select Patient?: No  Total # of Prescriptions Filled: 4   The following medications were delivered to the patient:  · Stool Softener  · Miralax  · Enoxaparin  · Oxycodone  Total # of Interventions Completed: 2  Time Spent (min): 75    Additional Documentation:  - Had to call and work with jacob the injury person at [facility] to get [facility] for this patient. Ended up having to call insurance and Theresa Jones 2-3 times to iron everything out.    - Coverage determination  - Discharge Counseling      Yisel Olsen  PharmD Candidate [id number]